# Patient Record
Sex: MALE | Race: WHITE | HISPANIC OR LATINO | Employment: FULL TIME | URBAN - METROPOLITAN AREA
[De-identification: names, ages, dates, MRNs, and addresses within clinical notes are randomized per-mention and may not be internally consistent; named-entity substitution may affect disease eponyms.]

---

## 2019-12-03 ENCOUNTER — OCCUPATIONAL HEALTH (OUTPATIENT)
Dept: URGENT CARE | Facility: CLINIC | Age: 44
End: 2019-12-03

## 2019-12-03 DIAGNOSIS — Z02.83 ENCOUNTER FOR EMPLOYMENT-RELATED DRUG TESTING: ICD-10-CM

## 2019-12-03 PROCEDURE — 80305 PR DRUG SCREEN - 1: ICD-10-PCS | Mod: S$GLB,,, | Performed by: EMERGENCY MEDICINE

## 2019-12-03 PROCEDURE — 80305 DRUG TEST PRSMV DIR OPT OBS: CPT | Mod: S$GLB,,, | Performed by: EMERGENCY MEDICINE

## 2020-05-10 ENCOUNTER — HOSPITAL ENCOUNTER (EMERGENCY)
Facility: HOSPITAL | Age: 45
Discharge: HOME | End: 2020-05-10
Attending: EMERGENCY MEDICINE

## 2020-05-10 VITALS
BODY MASS INDEX: 24.55 KG/M2 | OXYGEN SATURATION: 98 % | HEIGHT: 68 IN | WEIGHT: 162 LBS | SYSTOLIC BLOOD PRESSURE: 130 MMHG | HEART RATE: 90 BPM | DIASTOLIC BLOOD PRESSURE: 86 MMHG | TEMPERATURE: 98 F | RESPIRATION RATE: 16 BRPM

## 2020-05-10 DIAGNOSIS — H16.133 WELDERS' FLASH, BILATERAL: Primary | ICD-10-CM

## 2020-05-10 PROCEDURE — 99283 EMERGENCY DEPT VISIT LOW MDM: CPT

## 2020-05-10 PROCEDURE — 25000003 PHARM REV CODE 250

## 2020-05-10 RX ORDER — TETRACAINE HYDROCHLORIDE 5 MG/ML
2 SOLUTION OPHTHALMIC
Status: COMPLETED | OUTPATIENT
Start: 2020-05-10 | End: 2020-05-10

## 2020-05-10 RX ORDER — METOPROLOL SUCCINATE 25 MG/1
25 TABLET, EXTENDED RELEASE ORAL DAILY
COMMUNITY
End: 2020-11-30

## 2020-05-10 RX ADMIN — FLUORESCEIN SODIUM 1 EACH: 1 STRIP OPHTHALMIC at 03:05

## 2020-05-10 RX ADMIN — TETRACAINE HYDROCHLORIDE 2 DROP: 5 SOLUTION OPHTHALMIC at 03:05

## 2020-05-10 NOTE — ED NOTES
At D/C, Nate Torre is AA & O x 3, his skin is warm and dry, follow up care discussed at length with patient to include meds and follow-up with MD; patient given discharge instructions along with prescriptions, as indicated, and care sheets.

## 2020-07-17 ENCOUNTER — LAB VISIT (OUTPATIENT)
Dept: PRIMARY CARE CLINIC | Facility: OTHER | Age: 45
End: 2020-07-17
Attending: INTERNAL MEDICINE
Payer: OTHER GOVERNMENT

## 2020-07-17 DIAGNOSIS — Z03.818 ENCOUNTER FOR OBSERVATION FOR SUSPECTED EXPOSURE TO OTHER BIOLOGICAL AGENTS RULED OUT: ICD-10-CM

## 2020-07-17 PROCEDURE — U0003 INFECTIOUS AGENT DETECTION BY NUCLEIC ACID (DNA OR RNA); SEVERE ACUTE RESPIRATORY SYNDROME CORONAVIRUS 2 (SARS-COV-2) (CORONAVIRUS DISEASE [COVID-19]), AMPLIFIED PROBE TECHNIQUE, MAKING USE OF HIGH THROUGHPUT TECHNOLOGIES AS DESCRIBED BY CMS-2020-01-R: HCPCS

## 2020-07-22 LAB — SARS-COV-2 RNA RESP QL NAA+PROBE: NEGATIVE

## 2020-08-19 ENCOUNTER — OFFICE VISIT (OUTPATIENT)
Dept: URGENT CARE | Facility: CLINIC | Age: 45
End: 2020-08-19
Payer: OTHER GOVERNMENT

## 2020-08-19 VITALS
BODY MASS INDEX: 27.89 KG/M2 | SYSTOLIC BLOOD PRESSURE: 156 MMHG | TEMPERATURE: 98 F | HEIGHT: 68 IN | DIASTOLIC BLOOD PRESSURE: 93 MMHG | WEIGHT: 184 LBS | OXYGEN SATURATION: 98 % | HEART RATE: 77 BPM | RESPIRATION RATE: 16 BRPM

## 2020-08-19 DIAGNOSIS — L20.9 ATOPIC DERMATITIS, UNSPECIFIED TYPE: Primary | ICD-10-CM

## 2020-08-19 PROCEDURE — 99214 OFFICE O/P EST MOD 30 MIN: CPT | Mod: TIER,S$GLB,, | Performed by: NURSE PRACTITIONER

## 2020-08-19 PROCEDURE — 99214 PR OFFICE/OUTPT VISIT, EST, LEVL IV, 30-39 MIN: ICD-10-PCS | Mod: TIER,S$GLB,, | Performed by: NURSE PRACTITIONER

## 2020-08-19 RX ORDER — HYDROCORTISONE 25 MG/G
CREAM TOPICAL 2 TIMES DAILY
Qty: 28 G | Refills: 0 | Status: SHIPPED | OUTPATIENT
Start: 2020-08-19

## 2020-08-19 NOTE — PROGRESS NOTES
"Subjective:       Patient ID: Nate Torre is a 45 y.o. male.    Vitals:  height is 5' 8" (1.727 m) and weight is 83.5 kg (184 lb). His oral temperature is 98.4 °F (36.9 °C). His blood pressure is 156/93 (abnormal) and his pulse is 77. His respiration is 16 and oxygen saturation is 98%.     Chief Complaint: Rash    C/O red rash to anterior Lt forearm X 2 wks. +itching.     Rash  This is a new problem. The current episode started 1 to 4 weeks ago. Pertinent negatives include no congestion, cough, diarrhea, fatigue, fever, shortness of breath, sore throat or vomiting.       Constitution: Negative for chills, fatigue and fever.   HENT: Negative for congestion and sore throat.    Neck: Negative for painful lymph nodes.   Cardiovascular: Negative for chest pain and leg swelling.   Eyes: Negative for double vision and blurred vision.   Respiratory: Negative for cough and shortness of breath.    Gastrointestinal: Negative for nausea, vomiting and diarrhea.   Genitourinary: Negative for dysuria, frequency and urgency.   Musculoskeletal: Negative for joint pain, joint swelling, muscle cramps and muscle ache.   Skin: Positive for rash. Negative for color change and pale.   Allergic/Immunologic: Positive for itching. Negative for seasonal allergies.   Neurological: Negative for dizziness, history of vertigo, light-headedness, passing out and headaches.   Hematologic/Lymphatic: Negative for swollen lymph nodes, easy bruising/bleeding and history of blood clots. Does not bruise/bleed easily.   Psychiatric/Behavioral: Negative for nervous/anxious, sleep disturbance and depression. The patient is not nervous/anxious.        Objective:      Physical Exam   Constitutional: He is oriented to person, place, and time. He appears well-developed. He is cooperative.  Non-toxic appearance. He does not appear ill. No distress.   HENT:   Head: Normocephalic and atraumatic.   Ears:   Right Ear: Hearing, tympanic membrane, external ear and " ear canal normal.   Left Ear: Hearing, tympanic membrane, external ear and ear canal normal.   Nose: Nose normal. No mucosal edema, rhinorrhea or nasal deformity. No epistaxis. Right sinus exhibits no maxillary sinus tenderness and no frontal sinus tenderness. Left sinus exhibits no maxillary sinus tenderness and no frontal sinus tenderness.   Mouth/Throat: Uvula is midline, oropharynx is clear and moist and mucous membranes are normal. No trismus in the jaw. Normal dentition. No uvula swelling. No posterior oropharyngeal erythema.   Eyes: Conjunctivae and lids are normal. Right eye exhibits no discharge. Left eye exhibits no discharge. No scleral icterus.   Neck: Trachea normal, normal range of motion, full passive range of motion without pain and phonation normal. Neck supple.   Cardiovascular: Normal rate, regular rhythm, normal heart sounds and normal pulses.   Pulmonary/Chest: Effort normal and breath sounds normal. No respiratory distress.   Abdominal: Soft. Normal appearance and bowel sounds are normal. He exhibits no distension, no pulsatile midline mass and no mass. There is no abdominal tenderness.   Musculoskeletal: Normal range of motion.         General: No deformity.   Neurological: He is alert and oriented to person, place, and time. He exhibits normal muscle tone. Coordination normal.   Skin: Skin is warm, dry, intact, not diaphoretic, not pale and rash.      Psychiatric: His speech is normal and behavior is normal. Judgment and thought content normal.   Nursing note and vitals reviewed.        Assessment:       1. Atopic dermatitis, unspecified type        Plan:       Advised to use CeraVe lotion multiple times daily.     Atopic dermatitis, unspecified type    Other orders  -     hydrocortisone 2.5 % cream; Apply topically 2 (two) times daily.  Dispense: 28 g; Refill: 0

## 2020-08-19 NOTE — PATIENT INSTRUCTIONS
Use CeraVe lotion multiple times daily on left hand and forearm.     Atopic Dermatitis (Adult)  Atopic dermatitis is a dry, itchy, red rash. Its also called eczema. The rash is chronic, or ongoing. It can come and go over time. The disease is often passed down in families. It causes a problem with the skin barrier that makes the skin more sensitive to the environment and other factors. The increased skin sensitivity causes an itch, which causes scratching. Scratching can worsen the itching or also break the skin. This can put the skin at risk of infection.  The condition is most common in people with asthma, hay fever, hives, or dry or sensitive skin. The rash may be caused by extreme heat or heavy sweating. Skin irritants can cause the rash to flare up. These can include wool or silk clothing, grease, oils, some medicines, and harsh soaps and detergents. Emotional stress can also be a trigger.  Treatment is done to relieve the itching and inflammation of the skin.  Home care  Follow these tips to care for your condition:  · Keep the areas of rash clean by bathing at least every other day. Use lukewarm water to bathe. Dont use hot water, which can dry out the skin.  · Dont use soaps with strong detergents. Use mild soaps made for sensitive skin.  · Apply a cream or ointment to damp skin right after bathing.  · Avoid things that irritate your skin. Wear absorbent, soft fabrics next to the skin rather than rough or scratchy materials.  · Use mild laundry soap free of scents and perfumes. Make sure to rinse all the soap out of your clothes.  · Treat any skin infection as directed.  · Use oral diphenhydramine to help reduce itching. This is an antihistamine you can buy at drug and grocery stores. It can make you sleepy, so use lower doses during the daytime. Or you can use loratadine. This is an antihistamine that will not make you sleepy. Do not use diphenhydramine if you have glaucoma or have trouble urinating  due to an enlarged prostate.  Follow-up care  See your healthcare provider, or as advised. If your symptoms dont get better or if they get worse in the next 7 days, make an appointment with your healthcare provider.  When to seek medical advice  Call your healthcare provider right away  if any of these occur:  · Increasing area of redness or pain in the skin  · Yellow crusts or wet drainage from the rash  · Fever of 100.4°F (38°C) or higher, or as directed by your healthcare provider  Date Last Reviewed: 9/1/2016 © 2000-2017 [employer]. [address] [place] [id number]. All rights reserved. This information is not intended as a substitute for professional medical care. Always follow your healthcare professional's instructions.        Managing Atopic Dermatitis (Eczema)     After bathing, gently pat your skin dry (dont rub). Apply moisturizer while your skin is still damp.   To manage your symptoms and help reduce the severity and frequency, try these self-care tips:  Caring for your skin  · Use a gentle, fragrance-free cleanser (or nonsoap cleanser) for bathing. Rinse well. Pat skin dry.  · Take warm, not hot, baths or showers. Try to limit them to no more that 10 to 15 minutes.   · Use moisturizer liberally right after you bathe, while your skin is still damp.  · Avoid scratching because it will cause more damage to your skin.   · Topical, over-the-counter hydrocortisone cream may help control mild symptoms.   Controlling your environment  · Avoid extreme heat or cold.  · Avoid very humid or very dry air.  · If your home or office air is very dry, use a humidifier.  · Avoid allergens, such as dust, that may be present in bedding, carpets, plush toys, or rugs.  · Know that pet hair and dander can cause flare-ups.  Seeking medical treatment  Another way to keep symptoms under control is to seek medical treatment. Talk with your healthcare provider about the type of treatment that may work  best for you. Your provider may prescribe treatments such as the following:  · Topical treatments to put on the skin daily  · Medicines taken by mouth (oral medicines), such as antihistamines, antibiotics, or corticosteroids  · In severe cases shots (injections) may be needed to control the symptoms. You may even need antibiotics if skin infections occur.  Treatments dont work the same way for every person. So if your symptoms continue or get worse, ask your healthcare provider about other treatments.  Making lifestyle choices  · Manage the stress in your life.  · Wear loose-fitting cotton clothing that does not bind or rub your skin.  · Avoid contact with wool or other scratchy fabrics.  · Use fragrance-free products.  Getting good results  Now that you know more about atopic dermatitis, the next step is up to you. Follow your healthcare providers treatment plan and your self-care routine. This will help bring atopic dermatitis under control. If your symptoms persist, be sure to let your health care provider know.   Date Last Reviewed: 2/1/2017  © 3063-2521 [employer]. [address] [place] [address], [place] [address]. All rights reserved. This information is not intended as a substitute for professional medical care. Always follow your healthcare professional's instructions.

## 2020-12-08 RX ORDER — OMEPRAZOLE 20 MG/1
20 CAPSULE, DELAYED RELEASE ORAL
COMMUNITY
End: 2020-12-08 | Stop reason: SDUPTHER

## 2020-12-09 RX ORDER — OMEPRAZOLE 20 MG/1
20 CAPSULE, DELAYED RELEASE ORAL DAILY
Qty: 90 CAPSULE | Refills: 3 | Status: SHIPPED | OUTPATIENT
Start: 2020-12-09 | End: 2020-12-14 | Stop reason: SDUPTHER

## 2020-12-14 RX ORDER — OMEPRAZOLE 20 MG/1
20 CAPSULE, DELAYED RELEASE ORAL DAILY
Qty: 90 CAPSULE | Refills: 3 | Status: SHIPPED | OUTPATIENT
Start: 2020-12-14 | End: 2021-02-19 | Stop reason: SDUPTHER

## 2021-02-18 NOTE — TELEPHONE ENCOUNTER
----- Message from Diana Armijo sent at 2/18/2021 12:30 PM CST -----  Regarding: taurus Torre calling regarding Appointment Access  (message) for # appt give patient a call back at [phone] patient said if no answer leave VM anyday is ok for taurus

## 2021-02-22 RX ORDER — OMEPRAZOLE 20 MG/1
20 CAPSULE, DELAYED RELEASE ORAL DAILY
Qty: 90 CAPSULE | Refills: 3 | Status: SHIPPED | OUTPATIENT
Start: 2021-02-22 | End: 2022-02-23 | Stop reason: SDUPTHER

## 2021-06-09 RX ORDER — ATORVASTATIN CALCIUM 40 MG/1
40 TABLET, FILM COATED ORAL DAILY
Qty: 90 TABLET | Refills: 3 | Status: SHIPPED | OUTPATIENT
Start: 2021-06-09 | End: 2021-10-26 | Stop reason: SDUPTHER

## 2021-09-26 NOTE — ED PROVIDER NOTES
Occupational Therapy  First treatment session by MAGAN Brown.  3EF  Visit Type: treatment  Precautions:  Medical precautions:  abdominal precautions;.   Lines:     Basic: IV and wound vac      Lines in chart and on patient reviewed, precautions maintained throughout session.  Hearing: no hearing deficits  Vision:     Current vision: no visual deficits  Safety Measures: chair alarm (call light within reach)    SUBJECTIVE  Patient agreed to participate in therapy this date.  GONSALO Wright aware of tx session and update given to BIJAL Manuel end of tx session. \"they are thinking I may go home tomorrow.\"  Patient / Family Goal: return home and return to previous functional status    OBJECTIVE   Level of consciousness: alert    Oriented to person, place, time and situation     Arousal alertness: appropriate responses to stimuli    Affect/Behavior: alert, appropriate, cooperative and pleasant  Patient activity tolerance: 2 to 1 activity to rest  Functional Communication/Cognition    Overall status:  Within functional limits    Form of communication:  Verbal   Attention span:  Appears intact    Commands: follows all commands and directions consistently.    Safety judgement: good awareness of safety precautions.    Awareness of deficits: fully aware of deficits.  Hand Dominance: right    Transfers:    Assistive devices: gait belt    Sit to stand: supervision    Stand to sit: supervision    Training completed:    Tasks: stand to sit and sit to stand    Education details: patient safety  Functional Ambulation:    Assistance:stand by assist   Assistive device:none and gait belt (pushes IV pole)    Distance (ft): 500    Surface: even      Education details: patient safety  Details/Comments: Patient completing functional mobility with ambulation within room, to bathroom, and hallway 500 feet with supervision assist and no use of assistive device (pushing IV pole). No loss of balance or unsteadiness, steady pace.   Activities  Encounter Date: 5/10/2020     History     Chief Complaint   Patient presents with    pain and redness both eyes     Pt is a pipe-fitter.   Was working beside a  all day.  At 9 pm eyes became red and have become increasingly painful during the night     HPI   43yo M with pain and redness to both eyes. Worst on the left eye. Started at 9pm last night. No vision changes associated. Tearing but no pus/purulent discharge. Feels painful when he blinks. Was next to a  at work yesterday and did not have eye protection on. No itching.    Review of patient's allergies indicates:  No Known Allergies  Past Medical History:   Diagnosis Date    Coronary artery disease     Hypertension      Past Surgical History:   Procedure Laterality Date    CORONARY ARTERY BYPASS GRAFT       History reviewed. No pertinent family history.  Social History     Tobacco Use    Smoking status: Former Smoker   Substance Use Topics    Alcohol use: Not on file    Drug use: Not on file     Review of Systems   Constitutional: Negative for fever.   HENT: Negative for sore throat.    Eyes: Positive for pain and redness.   Respiratory: Negative for shortness of breath.    Cardiovascular: Negative for chest pain.   Gastrointestinal: Negative for nausea.   Genitourinary: Negative for dysuria.   Musculoskeletal: Negative for back pain.   Skin: Negative for rash.   Neurological: Negative for weakness.   Hematological: Does not bruise/bleed easily.       Physical Exam     Initial Vitals   BP Pulse Resp Temp SpO2   05/10/20 0313 05/10/20 0307 05/10/20 0307 05/10/20 0307 05/10/20 0307   130/86 90 16 98.1 °F (36.7 °C) 98 %      MAP       --                Physical Exam    Constitutional: He appears well-developed and well-nourished. He is not diaphoretic. No distress.   HENT:   Head: Normocephalic and atraumatic.   Eyes: EOM are normal. Pupils are equal, round, and reactive to light. Right eye exhibits no discharge. Left eye exhibits no discharge.    Conjunctive injected b/l. Pupils 4mm and reactive b/l   Neck: Normal range of motion. Neck supple.   Cardiovascular: Normal rate, regular rhythm and normal heart sounds. Exam reveals no gallop and no friction rub.    No murmur heard.  Pulmonary/Chest: Breath sounds normal. No respiratory distress. He has no wheezes. He has no rhonchi. He has no rales.   Abdominal: Soft. He exhibits no distension. There is no tenderness. There is no rebound and no guarding.   Musculoskeletal: He exhibits no edema or tenderness.   Neurological: He is alert and oriented to person, place, and time.   Skin: Skin is warm and dry.   Psychiatric: He has a normal mood and affect. Thought content normal.         ED Course   Procedures  Labs Reviewed - No data to display     MDM  43yo M with b/l eye pain after close proximity to a .  Handy lamp with stipling of the cornea. Tonopen demonstrates 23 on the right, 21 on the left. 20/20 vision without assistance. Likely 's eye based on the presentation and findings. No evidence of bacterial infection, less suspicious of allergic conjunctivitis given the story. Will discharge with erythromycin ointment. Will also give tetracaine drops and have given strict instructions that these cannot be used more than 24 hours and not more than every 4 hours in that time or there will be damage to the cornea. He will f/u with ophthalmology is he continues to have problems.    Mamadou Meyer MD  Resident, PGY-3  5/10/2020 3:48 AM      Imaging Results    None                                          Clinical Impression:       ICD-10-CM ICD-9-CM   1. Welders' flash, bilateral H16.133 370.24                                Mamadou Meyer MD  Resident  05/10/20 5085     of Daily Living (ADLs):  Grooming/Oral Hygiene:     Grooming assist: set up    Oral hygiene assist: set up    Position: standing at sink    Assist needed for: wash/dry face, wash/dry hands and teeth care  Toilet transfer:     Assist: supervision    Device: gait belt    Equipment: grab bar use  Toileting:     Assist: modified independent    Equipment: urinal  Activities of daily living training:   Patient with good standing tolerance with completion of grooming tasks standing sink side.              ASSESSMENT    Impairments: activity tolerance and pain  Functional Limitations: functional mobility, grooming, bathing, toileting, functional transfers, participating in meaningful/purposeful activities and dressing  Patient making good progress towards goals with increased overall activity tolerance and strong desire to regain prior functioning level  I agreed with the plan of care as outlined by the therapist.    Skilled therapy is required to address these limitations in attempt to maximize the patient's independence.  Progress: improving as expected and progressing toward goals    End of Session:   Location: in bathroom  Safety measures: call light within reach  Handoff to: nurse assistant    PLAN  Suggestions for next session as indicated: LB dressing skills, DC concerns    Frequency Comments: X M-F (9/26)            Interventions: activity tolerance training, ADL retraining, compensatory technique education, compensatory techniques, equipment eval/education, functional transfer training, patient education and patient/family training  Agreement to plan and goals: patient agrees with goals and treatment plan      GOALS  Review Date: 9/27/2021  Long Term Goals: (to be met by time of discharge from hospital)  Grooming: Patient will complete grooming tasks in standing modified independent.  Lower body dressing: Patient will complete lower body dressing modified independent.  Toileting: Patient will complete toileting  modified independent.  Toilet transfer: Patient will complete toilet transfer with modified independent.   Home setting transfer: Patient will complete home setting transfers with modified independent.         Documented in the chart in the following areas: Pain. Assessment. Plan.      SOLIMAN note Cosign/Accountability:    First Time POC:   Patient progress, plan of care and goals reviewed between providing occupational therapist and certified occupational therapy assistant.  Certified occupational therapy assistant to continue with current plan of care, current goals are appropriate and patient is progressing towards these set goals.    Talia Acosta, OT  Therapy procedure time and total treatment time can be found documented on the Time Entry flowsheet

## 2021-10-04 ENCOUNTER — TELEPHONE (OUTPATIENT)
Dept: CARDIOLOGY | Facility: CLINIC | Age: 46
End: 2021-10-04

## 2021-10-04 ENCOUNTER — PATIENT MESSAGE (OUTPATIENT)
Dept: CARDIOLOGY | Facility: CLINIC | Age: 46
End: 2021-10-04

## 2021-10-04 DIAGNOSIS — I10 HYPERTENSION, UNSPECIFIED TYPE: Primary | ICD-10-CM

## 2021-10-11 ENCOUNTER — HOSPITAL ENCOUNTER (OUTPATIENT)
Dept: CARDIOLOGY | Facility: CLINIC | Age: 46
Discharge: HOME | End: 2021-10-11
Attending: INTERNAL MEDICINE
Payer: COMMERCIAL

## 2021-10-11 DIAGNOSIS — I10 HYPERTENSION, UNSPECIFIED TYPE: ICD-10-CM

## 2021-10-11 PROCEDURE — 93015 CV STRESS TEST SUPVJ I&R: CPT | Mod: S$GLB,,, | Performed by: INTERNAL MEDICINE

## 2021-10-11 PROCEDURE — 93015 EXERCISE STRESS - EKG (CUPID ONLY): ICD-10-PCS | Mod: S$GLB,,, | Performed by: INTERNAL MEDICINE

## 2021-10-18 LAB
CV STRESS BASE HR: 73 BPM
DIASTOLIC BLOOD PRESSURE: 98 MMHG
OHS CV CPX 1 MINUTE RECOVERY HEART RATE: 114 BPM
OHS CV CPX 85 PERCENT MAX PREDICTED HEART RATE MALE: 147
OHS CV CPX ESTIMATED METS: 11
OHS CV CPX MAX PREDICTED HEART RATE: 173
OHS CV CPX PATIENT IS FEMALE: 0
OHS CV CPX PATIENT IS MALE: 1
OHS CV CPX PEAK DIASTOLIC BLOOD PRESSURE: 96 MMHG
OHS CV CPX PEAK HEAR RATE: 150 BPM
OHS CV CPX PEAK RATE PRESSURE PRODUCT: NORMAL
OHS CV CPX PEAK SYSTOLIC BLOOD PRESSURE: 184 MMHG
OHS CV CPX PERCENT MAX PREDICTED HEART RATE ACHIEVED: 87
OHS CV CPX RATE PRESSURE PRODUCT PRESENTING: NORMAL
STRESS ECHO POST EXERCISE DUR MIN: 9 MINUTES
STRESS ECHO POST EXERCISE DUR SEC: 22 SECONDS
SYSTOLIC BLOOD PRESSURE: 158 MMHG

## 2021-10-26 ENCOUNTER — OFFICE VISIT (OUTPATIENT)
Dept: CARDIOLOGY | Facility: CLINIC | Age: 46
End: 2021-10-26
Payer: COMMERCIAL

## 2021-10-26 VITALS
DIASTOLIC BLOOD PRESSURE: 88 MMHG | HEIGHT: 68 IN | SYSTOLIC BLOOD PRESSURE: 140 MMHG | WEIGHT: 190 LBS | OXYGEN SATURATION: 98 % | HEART RATE: 87 BPM | BODY MASS INDEX: 28.79 KG/M2

## 2021-10-26 DIAGNOSIS — I10 HYPERTENSION, UNSPECIFIED TYPE: Primary | ICD-10-CM

## 2021-10-26 DIAGNOSIS — I25.810 CORONARY ARTERY DISEASE INVOLVING CORONARY BYPASS GRAFT OF NATIVE HEART WITHOUT ANGINA PECTORIS: ICD-10-CM

## 2021-10-26 DIAGNOSIS — E78.5 DYSLIPIDEMIA: ICD-10-CM

## 2021-10-26 PROCEDURE — 99214 PR OFFICE/OUTPT VISIT, EST, LEVL IV, 30-39 MIN: ICD-10-PCS | Mod: S$GLB,,, | Performed by: NURSE PRACTITIONER

## 2021-10-26 PROCEDURE — 99214 OFFICE O/P EST MOD 30 MIN: CPT | Mod: S$GLB,,, | Performed by: NURSE PRACTITIONER

## 2021-10-26 RX ORDER — AMLODIPINE AND BENAZEPRIL HYDROCHLORIDE 5; 20 MG/1; MG/1
1 CAPSULE ORAL DAILY
Qty: 90 CAPSULE | Refills: 3 | Status: SHIPPED | OUTPATIENT
Start: 2021-10-26 | End: 2023-03-30 | Stop reason: SDUPTHER

## 2021-10-26 RX ORDER — ATORVASTATIN CALCIUM 40 MG/1
40 TABLET, FILM COATED ORAL DAILY
Qty: 90 TABLET | Refills: 3 | Status: SHIPPED | OUTPATIENT
Start: 2021-10-26 | End: 2022-05-26

## 2021-10-26 RX ORDER — METOPROLOL SUCCINATE 25 MG/1
25 TABLET, EXTENDED RELEASE ORAL DAILY
Qty: 90 TABLET | Refills: 3 | Status: SHIPPED | OUTPATIENT
Start: 2021-10-26 | End: 2021-12-12

## 2021-11-29 RX ORDER — METOPROLOL SUCCINATE 25 MG/1
25 TABLET, EXTENDED RELEASE ORAL DAILY
Qty: 90 TABLET | Refills: 3 | Status: CANCELLED | OUTPATIENT
Start: 2021-11-29

## 2022-03-19 ENCOUNTER — OFFICE VISIT (OUTPATIENT)
Dept: URGENT CARE | Facility: CLINIC | Age: 47
End: 2022-03-19
Payer: COMMERCIAL

## 2022-03-19 VITALS
TEMPERATURE: 97 F | RESPIRATION RATE: 20 BRPM | BODY MASS INDEX: 29.83 KG/M2 | WEIGHT: 196.81 LBS | HEART RATE: 80 BPM | HEIGHT: 68 IN | DIASTOLIC BLOOD PRESSURE: 100 MMHG | SYSTOLIC BLOOD PRESSURE: 156 MMHG | OXYGEN SATURATION: 98 %

## 2022-03-19 DIAGNOSIS — M25.561 ACUTE PAIN OF RIGHT KNEE: ICD-10-CM

## 2022-03-19 PROCEDURE — 99203 PR OFFICE/OUTPT VISIT, NEW, LEVL III, 30-44 MIN: ICD-10-PCS | Mod: TIER,S$GLB,, | Performed by: NURSE PRACTITIONER

## 2022-03-19 PROCEDURE — 73562 XR KNEE 3 VIEW RIGHT: ICD-10-PCS | Mod: RT,S$GLB,, | Performed by: RADIOLOGY

## 2022-03-19 PROCEDURE — 99203 OFFICE O/P NEW LOW 30 MIN: CPT | Mod: TIER,S$GLB,, | Performed by: NURSE PRACTITIONER

## 2022-03-19 PROCEDURE — 73562 X-RAY EXAM OF KNEE 3: CPT | Mod: RT,S$GLB,, | Performed by: RADIOLOGY

## 2022-03-19 RX ORDER — NAPROXEN 500 MG/1
500 TABLET ORAL 2 TIMES DAILY WITH MEALS
Qty: 14 TABLET | Refills: 0 | Status: SHIPPED | OUTPATIENT
Start: 2022-03-19 | End: 2022-03-26

## 2022-03-19 RX ORDER — ASPIRIN 81 MG/1
81 TABLET ORAL
COMMUNITY
End: 2023-03-30 | Stop reason: SDUPTHER

## 2022-03-19 NOTE — PROGRESS NOTES
"Subjective:       Patient ID: Nate Torre is a 46 y.o. male.    Vitals:  height is 5' 8" (1.727 m) and weight is 89.3 kg (196 lb 12.8 oz). His temperature is 97.3 °F (36.3 °C). His blood pressure is 156/100 (abnormal) and his pulse is 80. His respiration is 20 and oxygen saturation is 98%.     Chief Complaint: Knee Pain    Pt was sick three weeks ago with flu-like symptoms and started feeling right knee pain around the same time. Pain occurs with bending and squatting. Pt states no hx of arthritis but hx of heart procedure in 2016 to remove a blockage. He is taking Tylenol for pain. He says he has had no injury. No pain at rest. No swelling, no open wounds, no redness. No tender to palpation only with flexion and movement of the knee. No other abnormal symptoms.    Past Medical History:  No date: Coronary artery disease  No date: Hypertension    Past Surgical History:  No date: CORONARY ARTERY BYPASS GRAFT    History reviewed.  No pertinent family history.      Social History    Socioeconomic History      Marital status: Single    Tobacco Use      Smoking status: Former Smoker      Smokeless tobacco: Former User    Substance and Sexual Activity      Alcohol use: Yes        Alcohol/week: 1.0 standard drink        Types: 1 Cans of beer per week        Comment: 7 cans a week      Drug use: Never      Current Outpatient Medications:  amlodipine-benazepril 5-20 mg (LOTREL) 5-20 mg per capsule, Take 1 capsule by mouth once daily., Disp: 90 capsule, Rfl: 3  aspirin (ECOTRIN) 81 MG EC tablet, Take 81 mg by mouth., Disp: , Rfl:   atorvastatin (LIPITOR) 40 MG tablet, Take 1 tablet (40 mg total) by mouth once daily., Disp: 90 tablet, Rfl: 3  cloNIDine (CATAPRES) 0.1 MG tablet, TAKE 1 TABLET BY MOUTH ONCE DAILY AS NEEDED FOR  SYSTOLIC  BLOOD  PRESSURE  OVER  170, Disp: 30 tablet, Rfl: 0  hydrocortisone 2.5 % cream, Apply topically 2 (two) times daily. (Patient not taking: Reported on 10/26/2021), Disp: 28 g, Rfl: " 0  metoprolol succinate (TOPROL-XL) 25 MG 24 hr tablet, Take 1 tablet by mouth once daily, Disp: 90 tablet, Rfl: 0  omeprazole (PRILOSEC) 20 MG capsule, Take 1 capsule by mouth once daily, Disp: 90 capsule, Rfl: 0    No current facility-administered medications for this visit.      Review of patient's allergies indicates:  No Known Allergies    Knee Pain   The incident occurred more than 1 week ago. There was no injury mechanism. The pain is present in the right knee. The quality of the pain is described as shooting and cramping. He reports no foreign bodies present. The symptoms are aggravated by movement. He has tried acetaminophen for the symptoms. The treatment provided mild relief.       Constitution: Negative. Negative for chills, sweating, fatigue and fever.   HENT: Negative.    Neck: neck negative.   Cardiovascular: Negative.  Negative for chest pain and leg swelling.   Respiratory: Negative.  Negative for cough and shortness of breath.    Gastrointestinal: Negative.  Negative for nausea, constipation and diarrhea.   Genitourinary: Negative.    Musculoskeletal: Positive for pain, joint pain and pain with walking. Negative for trauma, joint swelling, abnormal ROM of joint, back pain and muscle ache.   Skin: Negative for rash, erythema and bruising.   Neurological: Negative.  Negative for dizziness.       Objective:      Physical Exam   Constitutional: He is oriented to person, place, and time.  Non-toxic appearance. No distress.   HENT:   Head: Normocephalic and atraumatic.   Cardiovascular: Normal rate.   Pulmonary/Chest: Effort normal. No respiratory distress.   Abdominal: Normal appearance.   Musculoskeletal:         General: No swelling.      Right knee: He exhibits decreased range of motion. He exhibits no swelling, no erythema, no LCL laxity, no bony tenderness and no MCL laxity. No tenderness found.      Right lower leg: No edema.      Left lower leg: No edema.        Legs:    Neurological: no focal  deficit. He is alert and oriented to person, place, and time.   Skin: Skin is no rash. No bruising and No erythema   Psychiatric: His behavior is normal. Mood normal.         Assessment:       1. Acute pain of right knee          Plan:     Xray show small joint effusion. Naprosyn, ice, avoid strenuous activity. See orthopedics if not resolving. Advised can not rule out blood clot in this setting, ED for any leg swelling, worsneing pain, or new or changing symptoms.     Acute pain of right knee  -     XR KNEE 3 VIEW RIGHT; Future; Expected date: 03/19/2022  -     Ambulatory referral/consult to Orthopedics    Other orders  -     naproxen (NAPROSYN) 500 MG tablet; Take 1 tablet (500 mg total) by mouth 2 (two) times daily with meals. for 7 days  Dispense: 14 tablet; Refill: 0

## 2022-03-21 ENCOUNTER — CLINICAL SUPPORT (OUTPATIENT)
Dept: URGENT CARE | Facility: CLINIC | Age: 47
End: 2022-03-21
Payer: COMMERCIAL

## 2022-03-21 VITALS
HEIGHT: 68 IN | HEART RATE: 82 BPM | OXYGEN SATURATION: 97 % | RESPIRATION RATE: 16 BRPM | TEMPERATURE: 97 F | BODY MASS INDEX: 29.7 KG/M2 | DIASTOLIC BLOOD PRESSURE: 80 MMHG | SYSTOLIC BLOOD PRESSURE: 139 MMHG | WEIGHT: 196 LBS

## 2022-03-21 DIAGNOSIS — M25.461 EFFUSION OF RIGHT KNEE: Primary | ICD-10-CM

## 2022-03-21 PROCEDURE — 99213 OFFICE O/P EST LOW 20 MIN: CPT | Mod: S$GLB,,, | Performed by: NURSE PRACTITIONER

## 2022-03-21 PROCEDURE — 99213 PR OFFICE/OUTPT VISIT, EST, LEVL III, 20-29 MIN: ICD-10-PCS | Mod: S$GLB,,, | Performed by: NURSE PRACTITIONER

## 2022-03-21 NOTE — PROGRESS NOTES
"Subjective:       Patient ID: Nate Torre is a 46 y.o. male.    Vitals:  height is 5' 8" (1.727 m) and weight is 88.9 kg (196 lb). His temperature is 97.4 °F (36.3 °C). His blood pressure is 139/80 and his pulse is 82. His respiration is 16 and oxygen saturation is 97%.     Chief Complaint: Follow-up    Pt. Was recently seen here Saturday for right knee pain, where x ray was done, and fluid was found behind knee, and was prescribed meds. Pt. Is now stating that he is worried about the meds prescribed to him, he had open heart sx in 2016, and was reading the paper the pharmacy gave him ,when he went to get his prescription, and is now worried about contraindication. Pt. Still complains of right knee pain, says pain is unchanged.   He is only taking 1 dose of Naprosyn.  He is also return to work and declined work excuse or light duty for work.  Patient's job requires him to climb ladders and crawled down in to small spaces.    Follow-up  Associated symptoms include arthralgias (Right knee) and joint swelling (Mild).       Musculoskeletal: Positive for joint pain (Right knee) and joint swelling (Mild). Negative for trauma.   Skin: Negative for erythema.       Objective:      Physical Exam   Constitutional: He is oriented to person, place, and time. He appears well-developed.  Non-toxic appearance. He does not appear ill. No distress.   HENT:   Head: Normocephalic and atraumatic.   Nose: Nose normal.   Mouth/Throat: Oropharynx is clear and moist.   Eyes: Conjunctivae and EOM are normal.   Abdominal: Normal appearance.   Musculoskeletal: Normal range of motion.         General: Tenderness (Right knee with activity and range of motion) present. No swelling, deformity or signs of injury. Normal range of motion.   Neurological: no focal deficit. He is alert and oriented to person, place, and time.   Skin: Skin is warm, dry and not diaphoretic. Capillary refill takes 2 to 3 seconds. No bruising, No erythema and No " lesion   Psychiatric: His behavior is normal. Mood normal.   Nursing note and vitals reviewed.        Assessment:       1. Effusion of right knee          Plan:         Effusion of right knee  -     Ambulatory referral/consult to Orthopedics    RICE, continue naprosyn.  Knee brace.  Offered work restrictions note vs out of work to rest knee.  Declined       Home care instructions provided.  Offered work excuse and or restricted duty at work, patient declined.  Informed patient that Naprosyn dose with short term course should be of very low risk to cardiovascular concerns without recent history of MI or other cardiac symptoms.  Informed to monitor blood pressure as well and if remaining elevated greater than 160/95 to stop taking Naprosyn.  Patient request referral to Orthopedics, referral placed.  I believe the patient is hoping that Orthopedics will be able to drain fluid from his knee.  I did discuss with him that by x-ray this is a small knee effusion and I am not certain if this is something that orthopedics would be willing to take on with invasive procedure when rest, ice, compression, NSAID use as prescribed would most likely resolve problem in a reasonable time.

## 2022-03-22 ENCOUNTER — TELEPHONE (OUTPATIENT)
Dept: CARDIOLOGY | Facility: CLINIC | Age: 47
End: 2022-03-22
Payer: COMMERCIAL

## 2022-03-22 ENCOUNTER — TELEPHONE (OUTPATIENT)
Dept: FAMILY MEDICINE | Facility: CLINIC | Age: 47
End: 2022-03-22
Payer: COMMERCIAL

## 2022-03-22 NOTE — PATIENT INSTRUCTIONS
Limit activity right knee.  Try to minimize climbing and or crawling activities.   Activity as tolerated.  Rest frequently and elevate legs as often as possible.  Purchase and knee brace and wear while at work for immobilization and protection of joint from excessive stress and strain on the knee.  Ice to knee for 15-20 minutes every few hours if possible.  Continue to take Naprosyn as prescribed.  It does a low dose at for a short period of time and is at low risk for cardiovascular concerns due to the short course of the prescription.  Referral placed to orthopedics for further evaluation and treatment of same

## 2022-03-22 NOTE — TELEPHONE ENCOUNTER
Spoke with pt, requesting hospital follow up. Unable to make appointment in Epic at the moment. Will print an call when able.

## 2022-03-22 NOTE — TELEPHONE ENCOUNTER
----- Message from Timoteo Gonzalez MA sent at 3/22/2022 12:57 PM CDT -----  Contact: LUDWIG BARR [[id number]]  Type: Needs Medical Advice    Who Called:LUDWIG BARR [[id number]]  Best Call Back Number: [phone]  Inquiry/Question: Would you kindly call LUDWIG BARR [[id number]] regarding upcoming appt would like to know if they need labs if no answer please leave message  Thank you~

## 2022-04-04 ENCOUNTER — TELEPHONE (OUTPATIENT)
Dept: CARDIOLOGY | Facility: CLINIC | Age: 47
End: 2022-04-04
Payer: COMMERCIAL

## 2022-04-04 DIAGNOSIS — E78.5 HYPERLIPIDEMIA, UNSPECIFIED HYPERLIPIDEMIA TYPE: Primary | ICD-10-CM

## 2022-04-05 ENCOUNTER — OFFICE VISIT (OUTPATIENT)
Dept: ORTHOPEDICS | Facility: CLINIC | Age: 47
End: 2022-04-05
Payer: COMMERCIAL

## 2022-04-05 VITALS — HEIGHT: 68 IN | WEIGHT: 196 LBS | BODY MASS INDEX: 29.7 KG/M2

## 2022-04-05 DIAGNOSIS — S83.241A ACUTE TEAR MEDIAL MENISCUS, RIGHT, INITIAL ENCOUNTER: Primary | ICD-10-CM

## 2022-04-05 DIAGNOSIS — M25.561 RIGHT KNEE PAIN, UNSPECIFIED CHRONICITY: Primary | ICD-10-CM

## 2022-04-05 PROCEDURE — 99999 PR PBB SHADOW E&M-EST. PATIENT-LVL II: CPT | Mod: PBBFAC,,, | Performed by: ORTHOPAEDIC SURGERY

## 2022-04-05 PROCEDURE — 99212 OFFICE O/P EST SF 10 MIN: CPT | Mod: PBBFAC,PN | Performed by: ORTHOPAEDIC SURGERY

## 2022-04-05 PROCEDURE — 99999 PR PBB SHADOW E&M-EST. PATIENT-LVL II: ICD-10-PCS | Mod: PBBFAC,,, | Performed by: ORTHOPAEDIC SURGERY

## 2022-04-05 PROCEDURE — 99204 OFFICE O/P NEW MOD 45 MIN: CPT | Mod: S$GLB,,, | Performed by: ORTHOPAEDIC SURGERY

## 2022-04-05 PROCEDURE — 99204 PR OFFICE/OUTPT VISIT, NEW, LEVL IV, 45-59 MIN: ICD-10-PCS | Mod: S$GLB,,, | Performed by: ORTHOPAEDIC SURGERY

## 2022-04-05 NOTE — PROGRESS NOTES
CC:  46-year-old male presents for evaluation of right knee pain.  The patient reports popping and buckling of the right knee.  He does not recall any particular injury but has been having the symptoms for several months now.  He has been taken Motrin and trying activity modification without much relief.  He was seen at an urgent care recently where they obtained x-rays that were read out as normal.  They advised him to follow-up with an orthopedist.  He presents today for evaluation.  He rates his pain as a 5/10.    Past Medical History:   Diagnosis Date    Coronary artery disease     Hypertension        Past Surgical History:   Procedure Laterality Date    CORONARY ARTERY BYPASS GRAFT         Current Outpatient Medications on File Prior to Visit   Medication Sig Dispense Refill    amlodipine-benazepril 5-20 mg (LOTREL) 5-20 mg per capsule Take 1 capsule by mouth once daily. 90 capsule 3    aspirin (ECOTRIN) 81 MG EC tablet Take 81 mg by mouth.      atorvastatin (LIPITOR) 40 MG tablet Take 1 tablet (40 mg total) by mouth once daily. 90 tablet 3    cloNIDine (CATAPRES) 0.1 MG tablet TAKE 1 TABLET BY MOUTH ONCE DAILY AS NEEDED FOR  SYSTOLIC  BLOOD  PRESSURE  OVER  170 30 tablet 0    hydrocortisone 2.5 % cream Apply topically 2 (two) times daily. (Patient not taking: Reported on 10/26/2021) 28 g 0    metoprolol succinate (TOPROL-XL) 25 MG 24 hr tablet Take 1 tablet by mouth once daily 90 tablet 0    omeprazole (PRILOSEC) 20 MG capsule Take 1 capsule by mouth once daily 90 capsule 0     No current facility-administered medications on file prior to visit.       ROS:    Constitution: Denies chills, fever, and sweats.  HENT: Denies headaches or blurry vision.  Cardiovascular: Denies chest pain or irregular heart beat.  Respiratory: Denies cough or shortness of breath.  Gastrointestinal: Denies abdominal pain, nausea, or vomiting.  Genitourinary:  Denies urinary incontinence, bladder and kidney  issues  Musculoskeletal:  Denies muscle cramps.  Neurological: Denies dizziness or focal weakness.  Psychiatric/Behavioral: Normal mental status.  Hematologic/Lymphatic: Denies bleeding problem or easy bruising/bleeding.  Skin: Denies rash or suspicious lesions.    Physical examination     Gen - No acute distress, well nourished, well groomed   Eyes - Extraoccular motions intact, pupils equally round and reactive to light and accommodation   ENT - normocephalic, atruamtic, oropharynx clear   Neck - Supple, no abnormal masses   Cardiovascular - regular rate and rhythm   Pulmonary - clear to auscultation bilaterally, no wheezes, ronchi, or rales   Abdomen - soft, non-tender, non-distended, positive bowel sounds   Psych - The patient is alert and oriented x3 with normal mood and affect    Examination of the Right Lower Extremity:     Skin intact throughout.  Motor function is intact distally EHL/FHL/TA/cory   +2 dorsalis pedis and posterior tibial pulses   Sensation to light touch intact distally dorsal, plantar, and first web space     Examination of the Right knee:    ROM 0 - 150   Effusion positive  Tenderness to palpation at the joint line positive  Pain during range of motion negative  Crepitation during range of motion negative     negative increased pain noted with flexion past 90   negative antalgic gait noted   negative Lachman's Test   negative Anterior Drawer Test   negative Posterior Drawer Test   positive McMurrays Test   positive Disco Test   negative Varus/Valgus instability    X-ray images were examined and personally interpreted by me.  Three views the right knee dated 03/19/2022 show well-maintained joint space with no advanced arthritic changes and no acute fractures.    Dx:  Likely medial meniscus tear right knee    Plan:  Recommendation is for an MRI of the right knee.  Follow up when the MRI is complete.

## 2022-04-09 ENCOUNTER — HOSPITAL ENCOUNTER (OUTPATIENT)
Dept: RADIOLOGY | Facility: HOSPITAL | Age: 47
Discharge: HOME | End: 2022-04-09
Attending: ORTHOPAEDIC SURGERY
Payer: COMMERCIAL

## 2022-04-09 DIAGNOSIS — M25.561 RIGHT KNEE PAIN, UNSPECIFIED CHRONICITY: ICD-10-CM

## 2022-04-09 PROCEDURE — 73721 MRI JNT OF LWR EXTRE W/O DYE: CPT | Mod: 26,RT,, | Performed by: RADIOLOGY

## 2022-04-09 PROCEDURE — 73721 MRI JNT OF LWR EXTRE W/O DYE: CPT | Mod: TC,RT

## 2022-04-09 PROCEDURE — 73721 MRI KNEE WITHOUT CONTRAST RIGHT: ICD-10-PCS | Mod: 26,RT,, | Performed by: RADIOLOGY

## 2022-04-14 ENCOUNTER — OFFICE VISIT (OUTPATIENT)
Dept: ORTHOPEDICS | Facility: CLINIC | Age: 47
End: 2022-04-14
Payer: COMMERCIAL

## 2022-04-14 VITALS — RESPIRATION RATE: 17 BRPM | BODY MASS INDEX: 29.55 KG/M2 | WEIGHT: 195 LBS | HEIGHT: 68 IN

## 2022-04-14 DIAGNOSIS — M76.51 PATELLAR TENDINITIS OF RIGHT KNEE: Primary | ICD-10-CM

## 2022-04-14 PROCEDURE — 99213 PR OFFICE/OUTPT VISIT, EST, LEVL III, 20-29 MIN: ICD-10-PCS | Mod: 25,S$GLB,, | Performed by: ORTHOPAEDIC SURGERY

## 2022-04-14 PROCEDURE — 1159F MED LIST DOCD IN RCRD: CPT | Mod: CPTII,S$GLB,, | Performed by: ORTHOPAEDIC SURGERY

## 2022-04-14 PROCEDURE — 20610 LARGE JOINT ASPIRATION/INJECTION: R KNEE: ICD-10-PCS | Mod: RT,S$GLB,, | Performed by: ORTHOPAEDIC SURGERY

## 2022-04-14 PROCEDURE — 3008F PR BODY MASS INDEX (BMI) DOCUMENTED: ICD-10-PCS | Mod: CPTII,S$GLB,, | Performed by: ORTHOPAEDIC SURGERY

## 2022-04-14 PROCEDURE — 4010F PR ACE/ARB THEARPY RXD/TAKEN: ICD-10-PCS | Mod: CPTII,S$GLB,, | Performed by: ORTHOPAEDIC SURGERY

## 2022-04-14 PROCEDURE — 99999 PR PBB SHADOW E&M-EST. PATIENT-LVL III: ICD-10-PCS | Mod: PBBFAC,,, | Performed by: ORTHOPAEDIC SURGERY

## 2022-04-14 PROCEDURE — 99213 OFFICE O/P EST LOW 20 MIN: CPT | Mod: 25,S$GLB,, | Performed by: ORTHOPAEDIC SURGERY

## 2022-04-14 PROCEDURE — 4010F ACE/ARB THERAPY RXD/TAKEN: CPT | Mod: CPTII,S$GLB,, | Performed by: ORTHOPAEDIC SURGERY

## 2022-04-14 PROCEDURE — 3008F BODY MASS INDEX DOCD: CPT | Mod: CPTII,S$GLB,, | Performed by: ORTHOPAEDIC SURGERY

## 2022-04-14 PROCEDURE — 99999 PR PBB SHADOW E&M-EST. PATIENT-LVL III: CPT | Mod: PBBFAC,,, | Performed by: ORTHOPAEDIC SURGERY

## 2022-04-14 PROCEDURE — 20610 DRAIN/INJ JOINT/BURSA W/O US: CPT | Mod: RT,S$GLB,, | Performed by: ORTHOPAEDIC SURGERY

## 2022-04-14 PROCEDURE — 1159F PR MEDICATION LIST DOCUMENTED IN MEDICAL RECORD: ICD-10-PCS | Mod: CPTII,S$GLB,, | Performed by: ORTHOPAEDIC SURGERY

## 2022-04-14 RX ORDER — METHYLPREDNISOLONE ACETATE 40 MG/ML
40 INJECTION, SUSPENSION INTRA-ARTICULAR; INTRALESIONAL; INTRAMUSCULAR; SOFT TISSUE
Status: DISCONTINUED | OUTPATIENT
Start: 2022-04-14 | End: 2022-04-14 | Stop reason: HOSPADM

## 2022-04-14 RX ADMIN — METHYLPREDNISOLONE ACETATE 40 MG: 40 INJECTION, SUSPENSION INTRA-ARTICULAR; INTRALESIONAL; INTRAMUSCULAR; SOFT TISSUE at 03:04

## 2022-04-14 NOTE — PROCEDURES
Large Joint Aspiration/Injection: R knee    Date/Time: 4/14/2022 3:30 PM  Performed by: Dontae Leach II, MD  Authorized by: Dontae Leach II, MD     Consent Done?:  Yes (Verbal)  Indications:  Pain  Timeout: prior to procedure the correct patient, procedure, and site was verified    Prep: patient was prepped and draped in usual sterile fashion      Local anesthesia used?: Yes    Local anesthetic:  Topical anesthetic    Details:  Needle Size:  22 G  Approach:  Anteromedial  Location:  Knee  Site:  R knee  Medications:  40 mg methylPREDNISolone acetate 40 mg/mL  Patient tolerance:  Patient tolerated the procedure well with no immediate complications

## 2022-12-24 ENCOUNTER — OFFICE VISIT (OUTPATIENT)
Dept: URGENT CARE | Facility: CLINIC | Age: 47
End: 2022-12-24
Payer: COMMERCIAL

## 2022-12-24 VITALS
SYSTOLIC BLOOD PRESSURE: 166 MMHG | HEIGHT: 68 IN | TEMPERATURE: 98 F | HEART RATE: 79 BPM | DIASTOLIC BLOOD PRESSURE: 109 MMHG | BODY MASS INDEX: 28.34 KG/M2 | RESPIRATION RATE: 16 BRPM | OXYGEN SATURATION: 98 % | WEIGHT: 187 LBS

## 2022-12-24 DIAGNOSIS — R03.0 ELEVATED BLOOD PRESSURE READING: ICD-10-CM

## 2022-12-24 DIAGNOSIS — Z86.79 HISTORY OF HYPERTENSION: Primary | ICD-10-CM

## 2022-12-24 DIAGNOSIS — Z76.0 ENCOUNTER FOR MEDICATION REFILL: ICD-10-CM

## 2022-12-24 PROCEDURE — 3077F PR MOST RECENT SYSTOLIC BLOOD PRESSURE >= 140 MM HG: ICD-10-PCS | Mod: CPTII,S$GLB,, | Performed by: NURSE PRACTITIONER

## 2022-12-24 PROCEDURE — 3008F PR BODY MASS INDEX (BMI) DOCUMENTED: ICD-10-PCS | Mod: CPTII,S$GLB,, | Performed by: NURSE PRACTITIONER

## 2022-12-24 PROCEDURE — 1160F RVW MEDS BY RX/DR IN RCRD: CPT | Mod: CPTII,S$GLB,, | Performed by: NURSE PRACTITIONER

## 2022-12-24 PROCEDURE — 3008F BODY MASS INDEX DOCD: CPT | Mod: CPTII,S$GLB,, | Performed by: NURSE PRACTITIONER

## 2022-12-24 PROCEDURE — 4010F ACE/ARB THERAPY RXD/TAKEN: CPT | Mod: CPTII,S$GLB,, | Performed by: NURSE PRACTITIONER

## 2022-12-24 PROCEDURE — 1160F PR REVIEW ALL MEDS BY PRESCRIBER/CLIN PHARMACIST DOCUMENTED: ICD-10-PCS | Mod: CPTII,S$GLB,, | Performed by: NURSE PRACTITIONER

## 2022-12-24 PROCEDURE — 3080F PR MOST RECENT DIASTOLIC BLOOD PRESSURE >= 90 MM HG: ICD-10-PCS | Mod: CPTII,S$GLB,, | Performed by: NURSE PRACTITIONER

## 2022-12-24 PROCEDURE — 1159F PR MEDICATION LIST DOCUMENTED IN MEDICAL RECORD: ICD-10-PCS | Mod: CPTII,S$GLB,, | Performed by: NURSE PRACTITIONER

## 2022-12-24 PROCEDURE — 3080F DIAST BP >= 90 MM HG: CPT | Mod: CPTII,S$GLB,, | Performed by: NURSE PRACTITIONER

## 2022-12-24 PROCEDURE — 4010F PR ACE/ARB THEARPY RXD/TAKEN: ICD-10-PCS | Mod: CPTII,S$GLB,, | Performed by: NURSE PRACTITIONER

## 2022-12-24 PROCEDURE — 99213 OFFICE O/P EST LOW 20 MIN: CPT | Mod: S$GLB,,, | Performed by: NURSE PRACTITIONER

## 2022-12-24 PROCEDURE — 1159F MED LIST DOCD IN RCRD: CPT | Mod: CPTII,S$GLB,, | Performed by: NURSE PRACTITIONER

## 2022-12-24 PROCEDURE — 99213 PR OFFICE/OUTPT VISIT, EST, LEVL III, 20-29 MIN: ICD-10-PCS | Mod: S$GLB,,, | Performed by: NURSE PRACTITIONER

## 2022-12-24 PROCEDURE — 3077F SYST BP >= 140 MM HG: CPT | Mod: CPTII,S$GLB,, | Performed by: NURSE PRACTITIONER

## 2022-12-24 RX ORDER — OMEPRAZOLE 20 MG/1
20 CAPSULE, DELAYED RELEASE ORAL DAILY
Qty: 30 CAPSULE | Refills: 0 | Status: SHIPPED | OUTPATIENT
Start: 2022-12-24 | End: 2023-03-30 | Stop reason: SDUPTHER

## 2022-12-24 RX ORDER — METOPROLOL SUCCINATE 25 MG/1
25 TABLET, EXTENDED RELEASE ORAL DAILY
Qty: 30 TABLET | Refills: 0 | Status: SHIPPED | OUTPATIENT
Start: 2022-12-24 | End: 2023-03-30 | Stop reason: SDUPTHER

## 2022-12-24 NOTE — PROGRESS NOTES
"Subjective:       Patient ID: Nate Torre is a 47 y.o. male.    Vitals:  height is 5' 8" (1.727 m) and weight is 84.8 kg (187 lb). His temperature is 98.1 °F (36.7 °C). His blood pressure is 166/109 (abnormal) and his pulse is 79. His respiration is 16 and oxygen saturation is 98%.     Chief Complaint: Medication Refill    Pt states he take BP meds, but he is running low and going out of country tomorrow. Unable to get in w/ DR. Medication is metoprolol.  Also requesting refill omeprazole      Constitution: Negative for chills and fever.   HENT:  Negative for congestion.    Cardiovascular:  Negative for chest pain.   Respiratory:  Negative for cough and shortness of breath.    Gastrointestinal:  Negative for abdominal pain, nausea, vomiting and diarrhea.   Skin:  Negative for rash.   Neurological:  Negative for dizziness, facial drooping, coordination disturbances, headaches, disorientation, altered mental status, numbness and tingling.   Psychiatric/Behavioral:  Negative for altered mental status and disorientation.      Objective:      Physical Exam   Constitutional: He is oriented to person, place, and time. He appears well-developed.  Non-toxic appearance. He does not appear ill. No distress.   HENT:   Head: Normocephalic and atraumatic.   Nose: Nose normal.   Mouth/Throat: Oropharynx is clear and moist.   Eyes: Conjunctivae and EOM are normal. Pupils are equal, round, and reactive to light.   Cardiovascular: Normal rate and regular rhythm.   Pulmonary/Chest: Effort normal. No respiratory distress.   Abdominal: Normal appearance.   Neurological: no focal deficit. He is alert and oriented to person, place, and time.   Skin: Skin is warm and dry. Capillary refill takes 2 to 3 seconds.   Psychiatric: His behavior is normal. Mood normal.   Nursing note and vitals reviewed.      Assessment:       1. History of hypertension    2. Elevated blood pressure reading    3. Encounter for medication refill          Plan: "         History of hypertension  -     metoprolol succinate (TOPROL-XL) 25 MG 24 hr tablet; Take 1 tablet (25 mg total) by mouth once daily.  Dispense: 30 tablet; Refill: 0    Elevated blood pressure reading  -     metoprolol succinate (TOPROL-XL) 25 MG 24 hr tablet; Take 1 tablet (25 mg total) by mouth once daily.  Dispense: 30 tablet; Refill: 0    Encounter for medication refill  -     metoprolol succinate (TOPROL-XL) 25 MG 24 hr tablet; Take 1 tablet (25 mg total) by mouth once daily.  Dispense: 30 tablet; Refill: 0  -     omeprazole (PRILOSEC) 20 MG capsule; Take 1 capsule (20 mg total) by mouth once daily.  Dispense: 30 capsule; Refill: 0

## 2023-03-02 ENCOUNTER — PATIENT MESSAGE (OUTPATIENT)
Dept: RESEARCH | Facility: HOSPITAL | Age: 48
End: 2023-03-02
Payer: COMMERCIAL

## 2023-03-30 ENCOUNTER — LAB VISIT (OUTPATIENT)
Dept: LAB | Facility: HOSPITAL | Age: 48
End: 2023-03-30
Attending: NURSE PRACTITIONER

## 2023-03-30 ENCOUNTER — OFFICE VISIT (OUTPATIENT)
Dept: CARDIOLOGY | Facility: CLINIC | Age: 48
End: 2023-03-30

## 2023-03-30 VITALS
OXYGEN SATURATION: 98 % | HEART RATE: 74 BPM | SYSTOLIC BLOOD PRESSURE: 160 MMHG | DIASTOLIC BLOOD PRESSURE: 94 MMHG | WEIGHT: 189.63 LBS | HEIGHT: 68 IN | BODY MASS INDEX: 28.74 KG/M2

## 2023-03-30 DIAGNOSIS — Z86.79 HISTORY OF HYPERTENSION: ICD-10-CM

## 2023-03-30 DIAGNOSIS — E78.5 HYPERLIPIDEMIA, UNSPECIFIED HYPERLIPIDEMIA TYPE: ICD-10-CM

## 2023-03-30 DIAGNOSIS — I25.810 CORONARY ARTERY DISEASE INVOLVING CORONARY BYPASS GRAFT OF NATIVE HEART WITHOUT ANGINA PECTORIS: ICD-10-CM

## 2023-03-30 DIAGNOSIS — Z76.0 ENCOUNTER FOR MEDICATION REFILL: ICD-10-CM

## 2023-03-30 DIAGNOSIS — R03.0 ELEVATED BLOOD PRESSURE READING: ICD-10-CM

## 2023-03-30 DIAGNOSIS — I10 HYPERTENSION, UNSPECIFIED TYPE: Primary | ICD-10-CM

## 2023-03-30 LAB
CHOLEST SERPL-MCNC: 233 MG/DL (ref 120–199)
CHOLEST/HDLC SERPL: 4.3 {RATIO} (ref 2–5)
HDLC SERPL-MCNC: 54 MG/DL (ref 40–75)
HDLC SERPL: 23.2 % (ref 20–50)
LDLC SERPL CALC-MCNC: 162.2 MG/DL (ref 63–159)
NONHDLC SERPL-MCNC: 179 MG/DL
TRIGL SERPL-MCNC: 84 MG/DL (ref 30–150)

## 2023-03-30 PROCEDURE — 80061 LIPID PANEL: CPT | Performed by: NURSE PRACTITIONER

## 2023-03-30 PROCEDURE — 93005 ELECTROCARDIOGRAM TRACING: CPT | Mod: PBBFAC,PN | Performed by: SPECIALIST

## 2023-03-30 PROCEDURE — 93010 EKG 12-LEAD: ICD-10-PCS | Mod: S$PBB,,, | Performed by: SPECIALIST

## 2023-03-30 PROCEDURE — 99214 OFFICE O/P EST MOD 30 MIN: CPT | Mod: PBBFAC,PN

## 2023-03-30 PROCEDURE — 36415 COLL VENOUS BLD VENIPUNCTURE: CPT | Performed by: NURSE PRACTITIONER

## 2023-03-30 PROCEDURE — 99214 OFFICE O/P EST MOD 30 MIN: CPT | Mod: S$PBB,,,

## 2023-03-30 PROCEDURE — 93010 ELECTROCARDIOGRAM REPORT: CPT | Mod: S$PBB,,, | Performed by: SPECIALIST

## 2023-03-30 PROCEDURE — 99999 PR PBB SHADOW E&M-EST. PATIENT-LVL IV: ICD-10-PCS | Mod: PBBFAC,,,

## 2023-03-30 PROCEDURE — 99214 PR OFFICE/OUTPT VISIT, EST, LEVL IV, 30-39 MIN: ICD-10-PCS | Mod: S$PBB,,,

## 2023-03-30 PROCEDURE — 99999 PR PBB SHADOW E&M-EST. PATIENT-LVL IV: CPT | Mod: PBBFAC,,,

## 2023-03-30 RX ORDER — AMLODIPINE AND BENAZEPRIL HYDROCHLORIDE 5; 20 MG/1; MG/1
1 CAPSULE ORAL DAILY
Qty: 90 CAPSULE | Refills: 3 | Status: SHIPPED | OUTPATIENT
Start: 2023-03-30

## 2023-03-30 RX ORDER — ATORVASTATIN CALCIUM 40 MG/1
40 TABLET, FILM COATED ORAL DAILY
Qty: 90 TABLET | Refills: 3 | Status: SHIPPED | OUTPATIENT
Start: 2023-03-30

## 2023-03-30 RX ORDER — METOPROLOL SUCCINATE 25 MG/1
25 TABLET, EXTENDED RELEASE ORAL DAILY
Qty: 90 TABLET | Refills: 3 | Status: SHIPPED | OUTPATIENT
Start: 2023-03-30

## 2023-03-30 RX ORDER — OMEPRAZOLE 20 MG/1
20 CAPSULE, DELAYED RELEASE ORAL DAILY
Qty: 90 CAPSULE | Refills: 3 | Status: SHIPPED | OUTPATIENT
Start: 2023-03-30

## 2023-03-30 RX ORDER — ASPIRIN 81 MG/1
81 TABLET ORAL ONCE
Qty: 90 TABLET | Refills: 3 | Status: SHIPPED | OUTPATIENT
Start: 2023-03-30 | End: 2023-03-30

## 2023-03-30 RX ORDER — CLONIDINE HYDROCHLORIDE 0.1 MG/1
TABLET ORAL
Qty: 30 TABLET | Refills: 0 | Status: SHIPPED | OUTPATIENT
Start: 2023-03-30

## 2023-03-30 NOTE — ASSESSMENT & PLAN NOTE
Recent .  Patient has not been taking atorvastatin 40 mg daily.  Patient to start taking daily.  Continue low-sodium heart healthy diet.

## 2023-03-30 NOTE — ASSESSMENT & PLAN NOTE
Patient had CABG x 6 2016.  Patient denies chest pain or anginal equivalent symptoms.  He is active.   Blood pressure today in office is 160/94.  Patient has been out of his medication.  The past 3-4 days.  Will hold off on adjusting his blood pressure medications at this time.  Patient to check his blood pressure twice a day for week and to notify me of blood pressure is remaining greater than 130/80.    Recommend low-sodium heart healthy diet.  Patient has also not been taking his atorvastatin daily.  Recommend compliant with taking atorvastatin every single day.

## 2023-03-30 NOTE — PROGRESS NOTES
Subjective:    Patient ID:  Nate Torre is a 47 y.o. male patient here for evaluation Follow-up (1 year )      History of Present Illness:     Is here for a checkup.  He denies chest pain, palpitations, shortness of breath, lightheadedness, dizziness, edema or bleeding.  He has been out of his blood pressure medications for the past 3 days.  Blood pressure today in office is 160/94.  The only medication he was taking for blood pressure was metoprolol 25 mg daily.    Patient is eating a low-sodium heart healthy diet.  He works daily.  He tolerates exercise and remains active.  He denies smoking, drug use.  He drinks socially on occasion.        Review of patient's allergies indicates:  No Known Allergies    Past Medical History:   Diagnosis Date    Coronary artery disease     Hypertension      Past Surgical History:   Procedure Laterality Date    CORONARY ARTERY BYPASS GRAFT       Social History     Tobacco Use    Smoking status: Former    Smokeless tobacco: Former   Substance Use Topics    Alcohol use: Yes     Alcohol/week: 1.0 standard drink     Types: 1 Cans of beer per week     Comment: 7 cans a week    Drug use: Never        Review of Systems:    As noted in HPI in addition      REVIEW OF SYSTEMS  CARDIOVASCULAR: No recent chest pain, palpitations, arm, neck, or jaw pain  RESPIRATORY: No recent fever, cough chills, SOB or congestion  : No blood in the urine  GI: No Nausea, vomiting, constipation, diarrhea, blood, or reflux.  MUSCULOSKELETAL: No myalgias  NEURO: No lightheadedness or dizziness  EYES: No Double vision, blurry, vision or headache              Objective        Vitals:    03/30/23 0922   BP: (!) 160/94   Pulse:        LIPIDS - LAST 2   Lab Results   Component Value Date    CHOL 233 (H) 03/30/2023    HDL 54 03/30/2023    LDLCALC 162.2 (H) 03/30/2023    TRIG 84 03/30/2023    CHOLHDL 23.2 03/30/2023       CBC - LAST 2  No results found for: WBC, RBC, HGB, HCT, MCV, MCH, MCHC, RDW, PLT, MPV,  GRAN, LYMPH, MONO, BASO, NRBC    CHEMISTRY & LIVER FUNCTION - LAST 2  No results found for: NA, K, CL, CO2, ANIONGAP, BUN, CREATININE, GLU, CALCIUM, PH, MG, ALBUMIN, PROT, ALKPHOS, ALT, AST, BILITOT     CARDIAC PROFILE - LAST 2  No results found for: BNP, CPK, CPKMB, LDH, TROPONINI, TROPONINIHS     COAGULATION - LAST 2  No results found for: LABPT, INR, APTT    ENDOCRINE & PSA - LAST 2  No results found for: HGBA1C, MICROALBUR, TSH, PROCAL, PSA     ECHOCARDIOGRAM RESULTS  No results found for this or any previous visit.      CURRENT/PREVIOUS VISIT EKG  No results found for this or any previous visit.  No valid procedures specified.   Results for orders placed during the hospital encounter of 10/11/21    Exercise Stress - EKG    Interpretation Summary    The EKG portion of this study is abnormal but not diagnostic.    The patient reported no chest pain during the stress test.    The blood pressure response to stress was normal.    There were no arrhythmias during stress.    Recommend a myocardial perfusion imaging study to enhance the specificity and sensitivity of the test    No valid procedures specified.    PHYSICAL EXAM  CONSTITUTIONAL: Well built, well nourished in no apparent distress  NECK: no carotid bruit, no JVD  LUNGS: CTA  CHEST WALL: no tenderness  HEART: regular rate and rhythm, S1, S2 normal, no murmur, click, rub or gallop   ABDOMEN: soft, non-tender; bowel sounds normal  EXTREMITIES: Extremities normal, no edema  NEURO: AAO X 3    I HAVE REVIEWED :    The vital signs, nurses notes, and all the pertinent radiology and labs.        Current Outpatient Medications   Medication Instructions    amlodipine-benazepril 5-20 mg (LOTREL) 5-20 mg per capsule 1 capsule, Oral, Daily    aspirin (ECOTRIN) 81 mg, Oral, Once    atorvastatin (LIPITOR) 40 mg, Oral, Daily    cloNIDine (CATAPRES) 0.1 MG tablet TAKE 1 TABLET BY MOUTH ONCE DAILY AS NEEDED FOR SYSTOLIC BLOOD PRESSURE OVER 170    hydrocortisone 2.5 % cream  Topical (Top), 2 times daily    metoprolol succinate (TOPROL-XL) 25 mg, Oral, Daily    omeprazole (PRILOSEC) 20 mg, Oral, Daily          Assessment & Plan     Coronary artery disease involving coronary bypass graft of native heart without angina pectoris  Patient had CABG x 6 2016.  Patient denies chest pain or anginal equivalent symptoms.  He is active.   Blood pressure today in office is 160/94.  Patient has been out of his medication.  The past 3-4 days.  Will hold off on adjusting his blood pressure medications at this time.  Patient to check his blood pressure twice a day for week and to notify me of blood pressure is remaining greater than 130/80.    Recommend low-sodium heart healthy diet.  Patient has also not been taking his atorvastatin daily.  Recommend compliant with taking atorvastatin every single day.      Hyperlipidemia  Recent .  Patient has not been taking atorvastatin 40 mg daily.  Patient to start taking daily.  Continue low-sodium heart healthy diet.    Hypertension  Blood pressure today in office 160/94.  Patient has been on medications for 3-4 days.  Continue current medication regimen.  Notify me if blood pressure remains greater than 130/80 consistently.  Recommend low-sodium are healthy diet.  Reduce stressors.  Reduce caffeine intake.    Encounter for medication refill  He is out of his medications at this time.  Refilled all medications this visit.          Follow up in about 3 months (around 6/30/2023).

## 2023-03-30 NOTE — ASSESSMENT & PLAN NOTE
Blood pressure today in office 160/94.  Patient has been on medications for 3-4 days.  Continue current medication regimen.  Notify me if blood pressure remains greater than 130/80 consistently.  Recommend low-sodium are healthy diet.  Reduce stressors.  Reduce caffeine intake.

## 2023-04-21 DIAGNOSIS — M25.561 PAIN IN BOTH KNEES, UNSPECIFIED CHRONICITY: Primary | ICD-10-CM

## 2023-04-21 DIAGNOSIS — M25.562 PAIN IN BOTH KNEES, UNSPECIFIED CHRONICITY: Primary | ICD-10-CM

## 2023-05-22 ENCOUNTER — HOSPITAL ENCOUNTER (EMERGENCY)
Facility: HOSPITAL | Age: 48
Discharge: HOME | End: 2023-05-22
Attending: EMERGENCY MEDICINE

## 2023-05-22 ENCOUNTER — OFFICE VISIT (OUTPATIENT)
Dept: ORTHOPEDICS | Facility: CLINIC | Age: 48
End: 2023-05-22
Payer: COMMERCIAL

## 2023-05-22 ENCOUNTER — TELEPHONE (OUTPATIENT)
Dept: ORTHOPEDICS | Facility: CLINIC | Age: 48
End: 2023-05-22

## 2023-05-22 VITALS
TEMPERATURE: 99 F | DIASTOLIC BLOOD PRESSURE: 90 MMHG | SYSTOLIC BLOOD PRESSURE: 148 MMHG | HEART RATE: 80 BPM | HEIGHT: 68 IN | RESPIRATION RATE: 18 BRPM | OXYGEN SATURATION: 100 % | BODY MASS INDEX: 27.28 KG/M2 | WEIGHT: 180 LBS

## 2023-05-22 VITALS — HEIGHT: 68 IN | WEIGHT: 179.88 LBS | BODY MASS INDEX: 27.26 KG/M2

## 2023-05-22 DIAGNOSIS — M70.21 OLECRANON BURSITIS OF RIGHT ELBOW: Primary | ICD-10-CM

## 2023-05-22 DIAGNOSIS — M25.729 OLECRANON BONE SPUR: ICD-10-CM

## 2023-05-22 DIAGNOSIS — M25.562 ACUTE PAIN OF LEFT KNEE: Primary | ICD-10-CM

## 2023-05-22 DIAGNOSIS — R60.9 SWELLING: ICD-10-CM

## 2023-05-22 DIAGNOSIS — R52 PAIN: ICD-10-CM

## 2023-05-22 DIAGNOSIS — G89.29 CHRONIC PAIN OF LEFT KNEE: ICD-10-CM

## 2023-05-22 DIAGNOSIS — S83.242A TEAR OF MEDIAL MENISCUS OF LEFT KNEE, CURRENT, INITIAL ENCOUNTER: Primary | ICD-10-CM

## 2023-05-22 DIAGNOSIS — M70.21 OLECRANON BURSITIS OF RIGHT ELBOW: ICD-10-CM

## 2023-05-22 DIAGNOSIS — M25.562 CHRONIC PAIN OF LEFT KNEE: ICD-10-CM

## 2023-05-22 PROCEDURE — 99214 PR OFFICE/OUTPT VISIT, EST, LEVL IV, 30-39 MIN: ICD-10-PCS | Mod: S$GLB,,, | Performed by: ORTHOPAEDIC SURGERY

## 2023-05-22 PROCEDURE — 99284 EMERGENCY DEPT VISIT MOD MDM: CPT

## 2023-05-22 PROCEDURE — 99999 PR PBB SHADOW E&M-EST. PATIENT-LVL III: CPT | Mod: PBBFAC,,, | Performed by: ORTHOPAEDIC SURGERY

## 2023-05-22 PROCEDURE — 99214 OFFICE O/P EST MOD 30 MIN: CPT | Mod: S$GLB,,, | Performed by: ORTHOPAEDIC SURGERY

## 2023-05-22 PROCEDURE — 99999 PR PBB SHADOW E&M-EST. PATIENT-LVL III: ICD-10-PCS | Mod: PBBFAC,,, | Performed by: ORTHOPAEDIC SURGERY

## 2023-05-22 RX ORDER — DICLOFENAC SODIUM 75 MG/1
75 TABLET, DELAYED RELEASE ORAL 2 TIMES DAILY
Qty: 14 TABLET | Refills: 0 | Status: SHIPPED | OUTPATIENT
Start: 2023-05-22

## 2023-05-22 RX ORDER — IBUPROFEN 600 MG/1
600 TABLET ORAL EVERY 6 HOURS PRN
COMMUNITY
Start: 2023-04-15

## 2023-05-22 NOTE — TELEPHONE ENCOUNTER
----- Message from Mamadou Valdivia sent at 5/22/2023 12:14 PM CDT -----  Regarding: [facility] ED f/u from 5/22, call pt   Contact: pt   [facility] ED f/u from 5/22, call pt

## 2023-05-22 NOTE — PROGRESS NOTES
CC:  47-year-old male presents for evaluation of right elbow and left knee.    The patient complains of pain in the right elbow the rates as a 4/10.  He states for the last 2 months he is had pain and swelling in the right elbow.  He states the pain increases in his right elbow with pressure.    With regards to the left knee he rates the pain as an 8/10.  He states that about 2 months ago he was getting an dressing in the shower and he felt and heard a pop or crack in his left knee.  He would immediate onset of pain and inability to bear weight.  He also states that the knee swelled and he had a huge effusion.  The effusion has progressively gotten better over time and he has hobbled around on the knee for 2 months now.  Although the swelling has gone the pain has persisted.  He reports continued popping and buckling of the knee with feelings of giving way.  Left knee pain increases with flexing the knee and squatting.    He is tried ibuprofen for both of these pains but has gotten no relief.    Past Medical History:   Diagnosis Date    Coronary artery disease     Hypertension        Past Surgical History:   Procedure Laterality Date    CORONARY ARTERY BYPASS GRAFT         Current Outpatient Medications on File Prior to Visit   Medication Sig Dispense Refill    amlodipine-benazepril 5-20 mg (LOTREL) 5-20 mg per capsule Take 1 capsule by mouth once daily. 90 capsule 3    atorvastatin (LIPITOR) 40 MG tablet Take 1 tablet (40 mg total) by mouth once daily. 90 tablet 3    cloNIDine (CATAPRES) 0.1 MG tablet TAKE 1 TABLET BY MOUTH ONCE DAILY AS NEEDED FOR SYSTOLIC BLOOD PRESSURE OVER 170 30 tablet 0    diclofenac (VOLTAREN) 75 MG EC tablet Take 1 tablet (75 mg total) by mouth 2 (two) times daily. 14 tablet 0    hydrocortisone 2.5 % cream Apply topically 2 (two) times daily. 28 g 0    metoprolol succinate (TOPROL-XL) 25 MG 24 hr tablet Take 1 tablet by mouth once daily 7 tablet 0    metoprolol succinate (TOPROL-XL) 25 MG  24 hr tablet Take 1 tablet (25 mg total) by mouth once daily. 90 tablet 3    omeprazole (PRILOSEC) 20 MG capsule Take 1 capsule by mouth once daily 7 capsule 0    omeprazole (PRILOSEC) 20 MG capsule Take 1 capsule (20 mg total) by mouth once daily. 90 capsule 3    aspirin (ECOTRIN) 81 MG EC tablet Take 1 tablet (81 mg total) by mouth once. for 1 dose 90 tablet 3     No current facility-administered medications on file prior to visit.       ROS:    Constitution: Denies chills, fever, and sweats.  HENT: Denies headaches or blurry vision.  Cardiovascular: Denies chest pain or irregular heart beat.  Respiratory: Denies cough or shortness of breath.  Gastrointestinal: Denies abdominal pain, nausea, or vomiting.  Genitourinary:  Denies urinary incontinence, bladder and kidney issues  Musculoskeletal:  Denies muscle cramps.  Positive for left knee and right elbow pain.  Neurological: Denies dizziness or focal weakness.  Psychiatric/Behavioral: Normal mental status.  Hematologic/Lymphatic: Denies bleeding problem or easy bruising/bleeding.  Skin: Denies rash or suspicious lesions.    Physical examination     Gen - No acute distress, well nourished, well groomed   Eyes - Extraoccular motions intact, pupils equally round and reactive to light and accommodation   ENT - normocephalic, atruamtic, oropharynx clear   Neck - Supple, no abnormal masses   Cardiovascular - regular rate and rhythm   Pulmonary - clear to auscultation bilaterally, no wheezes, ronchi, or rales   Abdomen - soft, non-tender, non-distended, positive bowel sounds   Psych - The patient is alert and oriented x3 with normal mood and affect    Right Upper Extremity Examination     Skin is intact throughout   Motor is intact distally radial, median, ulnar, AIN, PIN   +2 radial and ulnar pulses   Sensation to light touch is intact distally radial, median, and ulnar     Right Elbow Exam:     ROM - 0-150   Medial tenderness - negative  Lateral tenderness -  negative  Distal biceps tenderness - negative  Triceps tenderness - positive   Instability on exam - negative  Tinell's at the elbow - negative  Swelling - positive  Ecchymosis - negative    Examination of the Left Lower Extremity:     Skin intact throughout.  Motor function is intact distally EHL/FHL/TA/cory   +2 dorsalis pedis and posterior tibial pulses   Sensation to light touch intact distally dorsal, plantar, and first web space     Examination of the Left knee:    ROM 0 - 150   Effusion positive  Tenderness to palpation at the joint line positive  Pain during range of motion positive  Crepitation during range of motion negative     positive increased pain noted with flexion past 90   positive antalgic gait noted   negative Lachman's Test   negative Anterior Drawer Test   negative Posterior Drawer Test   positive McMurrays Test   positive Disco Test   negative Varus/Valgus instability    X-ray images were examined and personally interpreted by me.  Three views the right elbow dated 05/22/2023 show a calcification off of the olecranon of the right elbow were the distal triceps inserts.  This appears to be a osteophyte off of the olecranon and it appears to have fractured off of the olecranon.  There is some soft tissue swelling overlying that bony prominence.    Three views of the left knee dated 05/22/2023 shows well-maintained joint space with no advanced arthritic changes and no acute fractures.  Of note there is some vascular clips in the soft tissue.    Dx:  Olecranon bursitis secondary to a fractured osteophyte off of the right olecranon.  Tear of the medial meniscus of the left knee    Plan:  Recommendations for an MRI of the left knee.  With regards to the right elbow I did offer to excise the bony fragment from the olecranon and triceps insertion and that should relieve the bursitis.  He is considering that.  As far as his knee goes we will follow him up after the MRI is complete.

## 2023-05-22 NOTE — ED NOTES
Adult Physical Assessment  LOC: Nate Torre, 47 y.o. male verified via two identifiers.  The patient is awake, alert, oriented and speaking appropriately at this time.  APPEARANCE: Patient resting comfortably and appears to be in no acute distress at this time. Patient is clean and well groomed, patient's clothing is properly fastened.  SKIN:The skin is warm and dry, color consistent with ethnicity, patient has normal skin turgor and moist mucus membranes, skin intact, no breakdown or brusing noted.  MUSCULOSKELETAL: Patient moving all extremities well, no obvious swelling or deformities noted.  RESPIRATORY: Airway is open and patent, respirations are spontaneous, patient has a normal effort and rate, no accessory muscle use noted.  CARDIAC: Patient has a normal rate and rhythm, no periphreal edema noted in any extremity, capillary refill < 3 seconds in all extremities  ABDOMEN: Soft and non tender to palpation, no abdominal distention noted. Bowel sounds present in all four quadrants.  NEUROLOGIC: Eyes open spontaneously, behavior appropriate to situation, follows commands, facial expression symmetrical, bilateral hand grasp equal and even, purposeful motor response noted, normal sensation in all extremities when touched with a finger.

## 2023-05-22 NOTE — ED PROVIDER NOTES
Encounter Date: 5/22/2023       History     Chief Complaint   Patient presents with    Elbow Pain     RT X 2 MOS, SWELLING    Knee Pain     LEFT, X 1.5 MOS     Patient presents emergency department reported right elbow swelling and pain over last 2 months he denies any previous history denies any injury to the elbow he also complains of left knee pain states initially had significant swelling of the left knee that that has improved he does report that he felt a popping sensation and bending any trying to remove his pants prior to onset of the swelling states he is seen at orthopedist about his other knee but not the left knee is no noted trauma to this area as well      Review of patient's allergies indicates:  No Known Allergies  Past Medical History:   Diagnosis Date    Coronary artery disease     Hypertension      Past Surgical History:   Procedure Laterality Date    CORONARY ARTERY BYPASS GRAFT       No family history on file.  Social History     Tobacco Use    Smoking status: Former    Smokeless tobacco: Former   Substance Use Topics    Alcohol use: Yes     Alcohol/week: 1.0 standard drink     Types: 1 Cans of beer per week     Comment: 7 cans a week    Drug use: Never     Review of Systems   Constitutional:  Negative for chills and fever.   Musculoskeletal:  Positive for arthralgias and joint swelling.   All other systems reviewed and are negative.    Physical Exam     Initial Vitals [05/22/23 1013]   BP Pulse Resp Temp SpO2   (!) 184/116 80 16 98.5 °F (36.9 °C) 98 %      MAP       --         Physical Exam    Constitutional: He appears well-developed and well-nourished. No distress.   HENT:   Head: Normocephalic and atraumatic.   Right Ear: External ear normal.   Left Ear: External ear normal.   Mouth/Throat: Oropharynx is clear and moist.   Eyes: EOM are normal. Pupils are equal, round, and reactive to light.   Neck: Neck supple.   Normal range of motion.  Cardiovascular:  Normal rate, regular rhythm, S1  normal, S2 normal and intact distal pulses.           Musculoskeletal:         General: Normal range of motion.      Cervical back: Normal range of motion and neck supple.      Comments: Right elbow with mild swelling to the olecranon bursa no significant tenderness no erythema no induration there is mild swelling to left knee with no palpable will tenderness no erythema no significant effusion noted full range of motion to all extremities     Neurological: He is alert and oriented to person, place, and time. He has normal strength. GCS score is 15. GCS eye subscore is 4. GCS verbal subscore is 5. GCS motor subscore is 6.   Skin: Skin is warm and dry. Capillary refill takes less than 2 seconds. No rash noted. No erythema.   Psychiatric: He has a normal mood and affect. His behavior is normal.       ED Course   Procedures  Labs Reviewed - No data to display       Imaging Results              X-Ray Elbow Complete Right (Final result)  Result time 05/22/23 10:53:54      Final result by Steve Madden MD (05/22/23 10:53:54)                   Narrative:    Right elbow 4 views    CLINICAL DATA: Right elbow swelling    FINDINGS: 4 views are negative for fracture or dislocation. No acute osseous destructive process is identified.    There is soft tissue swelling over the olecranon process suggesting possible olecranon bursitis. Calcification adjacent to the olecranon process could be on the basis of the enthesophyte formation or bursal calcification.    IMPRESSION:  1. No acute osseous abnormalities.  2. Soft tissue swelling over the olecranon bursa suggesting olecranon bursitis. 14 mm calcification could be on the basis of enthesophyte formation or bursal calcification.    Electronically signed by:  Steve Madden MD  5/22/2023 10:53 AM CDT Workstation: [id number]                                     X-Ray Knee 3 View Left (Final result)  Result time 05/22/23 10:48:32      Final result by Man Martinez MD  (05/22/23 10:48:32)                   Narrative:    Reason: Left knee pain and swelling.    FINDINGS:    3 views of the left knee show no fracture, dislocation, or destructive osseous lesion. The joint spaces of the left knee are within normal limits. Patellar enthesopathy noted. No knee joint effusion. No gross soft tissue abnormality. Surgical clips are noted in the posterior medial knee soft tissues.    IMPRESSION:    Patellar enthesopathy. No acute osseous abnormality.    Electronically signed by:  Man Martinez DO  5/22/2023 10:48 AM CDT Workstation: [id number]                                     Medications - No data to display  Medical Decision Making:   ED Management:  Radiographs show no evidence of fracture dislocation significant joint effusions advised patient these findings recommendations for outpatient follow-up with orthopedist return to emergency department for worsened symptoms or new symptoms will prescribe diclofenac as an outpatient referral to Dr. Leach further evaluation treatment                        Clinical Impression:   Final diagnoses:  [R60.9] Swelling  [R52] Pain  [M70.21] Olecranon bursitis of right elbow (Primary)  [M25.562, G89.29] Chronic pain of left knee        ED Disposition Condition    Discharge Stable          ED Prescriptions       Medication Sig Dispense Start Date End Date Auth. Provider    diclofenac (VOLTAREN) 75 MG EC tablet Take 1 tablet (75 mg total) by mouth 2 (two) times daily. 14 tablet 5/22/2023 -- Ciro Epps MD          Follow-up Information       Follow up With Specialties Details Why Contact Info    Dontae Leach II, MD Orthopedic Surgery Call in 1 day for re-examination of your symptoms [address] [place] [address] DR Kerline LARIOS [id number]  [phone]               Ciro Epps MD  05/22/23 2558

## 2023-05-24 ENCOUNTER — HOSPITAL ENCOUNTER (OUTPATIENT)
Dept: RADIOLOGY | Facility: HOSPITAL | Age: 48
Discharge: HOME | End: 2023-05-24
Attending: ORTHOPAEDIC SURGERY
Payer: COMMERCIAL

## 2023-05-24 DIAGNOSIS — M25.562 ACUTE PAIN OF LEFT KNEE: ICD-10-CM

## 2023-05-24 PROCEDURE — 73721 MRI KNEE WITHOUT CONTRAST LEFT: ICD-10-PCS | Mod: 26,LT,, | Performed by: RADIOLOGY

## 2023-05-24 PROCEDURE — 73721 MRI JNT OF LWR EXTRE W/O DYE: CPT | Mod: 26,LT,, | Performed by: RADIOLOGY

## 2023-05-24 PROCEDURE — 73721 MRI JNT OF LWR EXTRE W/O DYE: CPT | Mod: TC,LT

## 2023-05-30 ENCOUNTER — OFFICE VISIT (OUTPATIENT)
Dept: ORTHOPEDICS | Facility: CLINIC | Age: 48
End: 2023-05-30

## 2023-05-30 VITALS — BODY MASS INDEX: 27.26 KG/M2 | WEIGHT: 179.88 LBS | HEIGHT: 68 IN

## 2023-05-30 DIAGNOSIS — S83.242A ACUTE TEAR MEDIAL MENISCUS, LEFT, INITIAL ENCOUNTER: Primary | ICD-10-CM

## 2023-05-30 PROCEDURE — 20610 DRAIN/INJ JOINT/BURSA W/O US: CPT | Mod: PBBFAC,PO | Performed by: ORTHOPAEDIC SURGERY

## 2023-05-30 PROCEDURE — 99214 OFFICE O/P EST MOD 30 MIN: CPT | Mod: S$PBB,25,, | Performed by: ORTHOPAEDIC SURGERY

## 2023-05-30 PROCEDURE — 99999 PR PBB SHADOW E&M-EST. PATIENT-LVL III: ICD-10-PCS | Mod: PBBFAC,,, | Performed by: ORTHOPAEDIC SURGERY

## 2023-05-30 PROCEDURE — 20610 LARGE JOINT ASPIRATION/INJECTION: L KNEE: ICD-10-PCS | Mod: S$PBB,LT,, | Performed by: ORTHOPAEDIC SURGERY

## 2023-05-30 PROCEDURE — 99999 PR PBB SHADOW E&M-EST. PATIENT-LVL III: CPT | Mod: PBBFAC,,, | Performed by: ORTHOPAEDIC SURGERY

## 2023-05-30 PROCEDURE — 99214 PR OFFICE/OUTPT VISIT, EST, LEVL IV, 30-39 MIN: ICD-10-PCS | Mod: S$PBB,25,, | Performed by: ORTHOPAEDIC SURGERY

## 2023-05-30 PROCEDURE — 20610 DRAIN/INJ JOINT/BURSA W/O US: CPT | Mod: PBBFAC,PO,LT | Performed by: ORTHOPAEDIC SURGERY

## 2023-05-30 RX ORDER — METHYLPREDNISOLONE ACETATE 80 MG/ML
80 INJECTION, SUSPENSION INTRA-ARTICULAR; INTRALESIONAL; INTRAMUSCULAR; SOFT TISSUE
Status: DISCONTINUED | OUTPATIENT
Start: 2023-05-30 | End: 2023-05-30 | Stop reason: HOSPADM

## 2023-05-30 RX ADMIN — METHYLPREDNISOLONE ACETATE 80 MG: 80 INJECTION, SUSPENSION INTRA-ARTICULAR; INTRALESIONAL; INTRAMUSCULAR; SOFT TISSUE at 09:05

## 2023-05-30 NOTE — PROCEDURES
Large Joint Aspiration/Injection: L knee    Date/Time: 5/30/2023 9:30 AM  Performed by: Dontae Leach II, MD  Authorized by: Dontae Leach II, MD     Consent Done?:  Yes (Verbal)  Indications:  Pain  Timeout: prior to procedure the correct patient, procedure, and site was verified      Local anesthesia used?: Yes    Local anesthetic:  Topical anesthetic    Details:  Needle Size:  22 G  Approach:  Anteromedial  Location:  Knee  Site:  L knee  Medications:  80 mg methylPREDNISolone acetate 80 mg/mL  Patient tolerance:  Patient tolerated the procedure well with no immediate complications

## 2023-05-30 NOTE — PROGRESS NOTES
CC:  47-year-old male follows up with left knee pain.  The patient has had pain in the left knee for about 2 months.  He felt and heard a pop in his left knee about 2 months ago and has had pain and mechanical symptoms since that time.  He currently reports his pain as a 7/10.    Past Medical History:   Diagnosis Date    Coronary artery disease     Hypertension        Past Surgical History:   Procedure Laterality Date    CORONARY ARTERY BYPASS GRAFT         Current Outpatient Medications on File Prior to Visit   Medication Sig Dispense Refill    amlodipine-benazepril 5-20 mg (LOTREL) 5-20 mg per capsule Take 1 capsule by mouth once daily. 90 capsule 3    aspirin (ECOTRIN) 81 MG EC tablet Take 1 tablet (81 mg total) by mouth once. for 1 dose 90 tablet 3    atorvastatin (LIPITOR) 40 MG tablet Take 1 tablet (40 mg total) by mouth once daily. 90 tablet 3    cloNIDine (CATAPRES) 0.1 MG tablet TAKE 1 TABLET BY MOUTH ONCE DAILY AS NEEDED FOR SYSTOLIC BLOOD PRESSURE OVER 170 30 tablet 0    diclofenac (VOLTAREN) 75 MG EC tablet Take 1 tablet (75 mg total) by mouth 2 (two) times daily. 14 tablet 0    hydrocortisone 2.5 % cream Apply topically 2 (two) times daily. 28 g 0    ibuprofen (ADVIL,MOTRIN) 600 MG tablet Take 600 mg by mouth every 6 (six) hours as needed.      metoprolol succinate (TOPROL-XL) 25 MG 24 hr tablet Take 1 tablet by mouth once daily 7 tablet 0    metoprolol succinate (TOPROL-XL) 25 MG 24 hr tablet Take 1 tablet (25 mg total) by mouth once daily. 90 tablet 3    omeprazole (PRILOSEC) 20 MG capsule Take 1 capsule by mouth once daily 7 capsule 0    omeprazole (PRILOSEC) 20 MG capsule Take 1 capsule (20 mg total) by mouth once daily. 90 capsule 3     No current facility-administered medications on file prior to visit.       ROS:    Constitution: Denies chills, fever, and sweats.  HENT: Denies headaches or blurry vision.  Cardiovascular: Denies chest pain or irregular heart beat.  Respiratory: Denies cough or  shortness of breath.  Gastrointestinal: Denies abdominal pain, nausea, or vomiting.  Genitourinary:  Denies urinary incontinence, bladder and kidney issues  Musculoskeletal:  Denies muscle cramps.  Positive for left knee pain and mechanical symptoms  Neurological: Denies dizziness or focal weakness.  Psychiatric/Behavioral: Normal mental status.  Hematologic/Lymphatic: Denies bleeding problem or easy bruising/bleeding.  Skin: Denies rash or suspicious lesions.    Physical examination     Gen - No acute distress, well nourished, well groomed   Eyes - Extraoccular motions intact, pupils equally round and reactive to light and accommodation   ENT - normocephalic, atruamtic, oropharynx clear   Neck - Supple, no abnormal masses   Cardiovascular - regular rate and rhythm   Pulmonary - clear to auscultation bilaterally, no wheezes, ronchi, or rales   Abdomen - soft, non-tender, non-distended, positive bowel sounds   Psych - The patient is alert and oriented x3 with normal mood and affect    Examination of the Left Lower Extremity:     Skin intact throughout.  Motor function is intact distally EHL/FHL/TA/cory   +2 dorsalis pedis and posterior tibial pulses   Sensation to light touch intact distally dorsal, plantar, and first web space     Examination of the Left knee:    ROM 0 - 150   Effusion negative  Tenderness to palpation at the joint line positive  Pain during range of motion positive  Crepitation during range of motion negative     positive increased pain noted with flexion past 90   negative antalgic gait noted   negative Lachman's Test   negative Anterior Drawer Test   negative Posterior Drawer Test   positive McMurrays Test   positive Disco Test   negative Varus/Valgus instability    MRI images were examined and personally interpreted by me.  MRI of the left knee dated 05/24/2023 shows a tear in the posterior horn of the medial meniscus.    Dx:  Tear of the medial meniscus of the left knee    Plan:  We discussed  treatment options.  The patient states he is not interested in surgery at this time.  I offered him an injection to help relieve his pain and he agreed.  We injected the left knee with a mixture of 2, 2, 1.  He tolerated that well.  Follow up p.r.n..

## 2024-04-07 DIAGNOSIS — I27.20 HYPERTENSIVE PULMONARY VASCULAR DISEASE: Primary | ICD-10-CM

## 2024-04-08 RX ORDER — CLONIDINE HYDROCHLORIDE 0.1 MG/1
TABLET ORAL
Qty: 30 TABLET | Refills: 0 | Status: SHIPPED | OUTPATIENT
Start: 2024-04-08

## 2024-04-08 NOTE — TELEPHONE ENCOUNTER
Last  seen in office 3/30/23 gary/BELLA Toth patient requesting refill    Assigned annual visit  5/21/24 @ 9:00am   mailbox full will message thru My chart about assigned visit for refill

## 2024-04-17 DIAGNOSIS — R03.0 ELEVATED BLOOD PRESSURE READING: ICD-10-CM

## 2024-04-17 DIAGNOSIS — Z86.79 HISTORY OF HYPERTENSION: ICD-10-CM

## 2024-04-17 DIAGNOSIS — Z76.0 ENCOUNTER FOR MEDICATION REFILL: ICD-10-CM

## 2024-04-22 RX ORDER — METOPROLOL SUCCINATE 25 MG/1
25 TABLET, EXTENDED RELEASE ORAL
Qty: 90 TABLET | Refills: 0 | Status: SHIPPED | OUTPATIENT
Start: 2024-04-22

## 2024-04-22 RX ORDER — OMEPRAZOLE 20 MG/1
20 CAPSULE, DELAYED RELEASE ORAL
Qty: 90 CAPSULE | Refills: 0 | Status: SHIPPED | OUTPATIENT
Start: 2024-04-22

## 2024-08-15 ENCOUNTER — OFFICE VISIT (OUTPATIENT)
Dept: CARDIOLOGY | Facility: CLINIC | Age: 49
End: 2024-08-15

## 2024-08-15 VITALS
DIASTOLIC BLOOD PRESSURE: 80 MMHG | SYSTOLIC BLOOD PRESSURE: 130 MMHG | BODY MASS INDEX: 30.14 KG/M2 | HEIGHT: 68 IN | HEART RATE: 78 BPM | WEIGHT: 198.88 LBS | OXYGEN SATURATION: 98 %

## 2024-08-15 DIAGNOSIS — I10 HYPERTENSION, UNSPECIFIED TYPE: ICD-10-CM

## 2024-08-15 DIAGNOSIS — Z76.0 ENCOUNTER FOR MEDICATION REFILL: Primary | ICD-10-CM

## 2024-08-15 DIAGNOSIS — E78.2 MIXED HYPERLIPIDEMIA: ICD-10-CM

## 2024-08-15 DIAGNOSIS — I25.810 CORONARY ARTERY DISEASE INVOLVING CORONARY BYPASS GRAFT OF NATIVE HEART WITHOUT ANGINA PECTORIS: ICD-10-CM

## 2024-08-15 PROCEDURE — 99999 PR PBB SHADOW E&M-EST. PATIENT-LVL III: CPT | Mod: PBBFAC,,, | Performed by: INTERNAL MEDICINE

## 2024-08-15 PROCEDURE — 99213 OFFICE O/P EST LOW 20 MIN: CPT | Mod: PBBFAC,PN | Performed by: INTERNAL MEDICINE

## 2024-08-15 PROCEDURE — 99213 OFFICE O/P EST LOW 20 MIN: CPT | Mod: S$PBB,,, | Performed by: INTERNAL MEDICINE

## 2024-08-15 RX ORDER — ATORVASTATIN CALCIUM 40 MG/1
40 TABLET, FILM COATED ORAL DAILY
Qty: 90 TABLET | Refills: 3 | Status: SHIPPED | OUTPATIENT
Start: 2024-08-15

## 2024-08-15 RX ORDER — METOPROLOL SUCCINATE 25 MG/1
25 TABLET, EXTENDED RELEASE ORAL DAILY
Qty: 90 TABLET | Refills: 3 | Status: SHIPPED | OUTPATIENT
Start: 2024-08-15

## 2024-08-15 RX ORDER — LISINOPRIL 20 MG/1
20 TABLET ORAL DAILY
Qty: 90 TABLET | Refills: 3 | Status: SHIPPED | OUTPATIENT
Start: 2024-08-15 | End: 2025-08-15

## 2024-08-15 RX ORDER — OMEPRAZOLE 20 MG/1
20 CAPSULE, DELAYED RELEASE ORAL DAILY
Qty: 90 CAPSULE | Refills: 2 | Status: SHIPPED | OUTPATIENT
Start: 2024-08-15

## 2024-08-15 NOTE — ASSESSMENT & PLAN NOTE
He has been off his medications.  Refills were given.  Lisinopril 20 mg daily will be started in addition to the Toprol-XL 25 mg p.o. daily.

## 2024-08-15 NOTE — PROGRESS NOTES
Patient ID:  Nate Torre is a 49 y.o. male who presents for follow-up of Hypertension      Coronary artery disease involving coronary bypass graft of native heart without angina pectoris  Patient had CABG x 6 2016.  Patient denies chest pain or anginal equivalent symptoms.  He is active.   Blood pressure today in office is 160/94.       Hyperlipidemia  Recent .  Patient has not been taking atorvastatin 40 mg daily.  Patient to start taking daily.  Continue low-sodium heart healthy diet.     Hypertension  Blood pressure today in office 160/94.  Patient has been on medications for 3-4 days.  Continue current medication regimen.  Notify me if blood pressure remains greater than 130/80 consistently.  Recommend low-sodium are healthy diet.  Reduce stressors.  Reduce caffeine intake.    He run out of blood pressure medications about a month ago.  The only 1 he has is clonidine 0.1 mg and he is taking it every 3rd day.  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  He uses the stairs and at times he noticed some tachycardia and dyspnea on exertion.  He denies any chest pains.  He has history of peptic ulcer disease on omeprazole.  He had coronary artery bypass performed in 2016 at that time he was having symptoms of chest pains.  He denies any chest pains at present.  His blood pressure 168/98.  The medications on the chart revealed that he was on amlodipine benazepril although he has not been taking that medication.  The only thing he is taking for hypertension is metoprolol that he has not taken it for over a month.  He does describe some erectile dysfunction.  Will prescribe something after his blood pressure is well controlled.             Past Medical History:   Diagnosis Date    Coronary artery disease     Hypertension         Past Surgical History:   Procedure Laterality Date    CORONARY ARTERY BYPASS GRAFT            Current Outpatient Medications   Medication Instructions    aspirin (ECOTRIN) 81 mg, Oral, Once     "atorvastatin (LIPITOR) 40 mg, Oral, Daily    cloNIDine (CATAPRES) 0.1 MG tablet TAKE 1 TABLET BY MOUTH ONCE DAILY AS NEEDED FOR SYSTOLIC BLOOD PRESSURE OVER 170    diclofenac (VOLTAREN) 75 mg, Oral, 2 times daily    hydrocortisone 2.5 % cream Topical (Top), 2 times daily    ibuprofen (ADVIL,MOTRIN) 600 mg, Oral, Every 6 hours PRN    lisinopriL (PRINIVIL,ZESTRIL) 20 mg, Oral, Daily    metoprolol succinate (TOPROL-XL) 25 mg, Oral    metoprolol succinate (TOPROL-XL) 25 mg, Oral, Daily    omeprazole (PRILOSEC) 20 mg, Oral    omeprazole (PRILOSEC) 20 mg, Oral, Daily        Review of patient's allergies indicates:  No Known Allergies     Review of Systems   Cardiovascular:  Positive for dyspnea on exertion. Negative for chest pain and palpitations.   Respiratory:  Negative for cough and shortness of breath.         Objective:     Vitals:    08/15/24 1118   BP: 130/80   BP Location: Left arm   Patient Position: Sitting   BP Method: Medium (Manual)   Pulse: 78   SpO2: 98%   Weight: 90.2 kg (198 lb 13.7 oz)   Height: 5' 8" (1.727 m)       Physical Exam  Vitals and nursing note reviewed.   Constitutional:       Appearance: He is well-developed.   HENT:      Head: Normocephalic and atraumatic.   Eyes:      Conjunctiva/sclera: Conjunctivae normal.   Cardiovascular:      Rate and Rhythm: Normal rate and regular rhythm.      Heart sounds: Normal heart sounds.   Pulmonary:      Effort: Pulmonary effort is normal.      Breath sounds: Normal breath sounds.   Abdominal:      General: Bowel sounds are normal.      Palpations: Abdomen is soft.   Musculoskeletal:         General: Normal range of motion.   Skin:     General: Skin is warm and dry.   Neurological:      Mental Status: He is alert and oriented to person, place, and time.   Psychiatric:         Behavior: Behavior normal.         Thought Content: Thought content normal.         Judgment: Judgment normal.       CMP  No results found for: "NA", "K", "CL", "CO2", "GLU", "BUN", " ""CREATININE", "CALCIUM", "PROT", "ALBUMIN", "BILITOT", "ALKPHOS", "AST", "ALT", "ANIONGAP", "ESTGFRAFRICA", "EGFRNONAA"   BMP  No results found for: "NA", "K", "CL", "CO2", "BUN", "CREATININE", "CALCIUM", "ANIONGAP", "ESTGFRAFRICA", "EGFRNONAA"   BNP  @LABRCNTIP(BNP,BNPTRIAGEBLO)@   Lab Results   Component Value Date    CHOL 233 (H) 03/30/2023     Lab Results   Component Value Date    HDL 54 03/30/2023     Lab Results   Component Value Date    LDLCALC 162.2 (H) 03/30/2023     Lab Results   Component Value Date    TRIG 84 03/30/2023     Lab Results   Component Value Date    CHOLHDL 23.2 03/30/2023      No results found for: "TSH", "U8MACPS", "X8MAGQF", "THYROIDAB", "FREET4"  No results found for: "LABA1C", "HGBA1C"  No results found for: "WBC", "HGB", "HCT", "MCV", "PLT"      No results found for this or any previous visit.     No results found for this or any previous visit.     EKG  Results for orders placed or performed in visit on 03/30/23   IN OFFICE EKG 12-LEAD (to [place])    Collection Time: 03/30/23  9:19 AM    Narrative    Test Reason : I10,I25.810,    Vent. Rate : 074 BPM     Atrial Rate : 074 BPM     P-R Int : 162 ms          QRS Dur : 094 ms      QT Int : 382 ms       P-R-T Axes : 074 094 037 degrees     QTc Int : 424 ms    Normal sinus rhythm  Right atrial enlargement  Rightward axis  Borderline Abnormal ECG  No previous ECGs available  Confirmed by Slick CENTENO, Hugo BILL ([id number]) on 4/16/2023 8:10:53 PM    Referred By:             Confirmed By:Hugo Kruger MD      Stress  Results for orders placed during the hospital encounter of 10/11/21    Exercise Stress - EKG    Interpretation Summary    The EKG portion of this study is abnormal but not diagnostic.    The patient reported no chest pain during the stress test.    The blood pressure response to stress was normal.    There were no arrhythmias during stress.    Recommend a myocardial perfusion imaging study to enhance the specificity and sensitivity of the " test             Assessment:       Coronary artery disease involving coronary bypass graft of native heart without angina pectoris  Coronary artery disease with previous coronary artery bypass.    Hyperlipidemia  His LDL cholesterol is markedly elevated.  He has been compliant with his statin    Hypertension  He has been off his medications.  Refills were given.  Lisinopril 20 mg daily will be started in addition to the Toprol-XL 25 mg p.o. daily.       Plan:       He is to resume the metoprolol 25 mg daily, atorvastatin 40 mg daily, aspirin 81 mg daily, lisinopril 20 mg daily.  He is to have a CMP , lipid profile and a TSH from [facility].  He is to bring the results with him in 3 months.  At that time will consider using medications for his erectile dysfunction.

## 2025-04-22 DIAGNOSIS — Z76.0 ENCOUNTER FOR MEDICATION REFILL: ICD-10-CM

## 2025-04-22 DIAGNOSIS — I10 HYPERTENSION, UNSPECIFIED TYPE: ICD-10-CM

## 2025-04-22 DIAGNOSIS — I25.810 CORONARY ARTERY DISEASE INVOLVING CORONARY BYPASS GRAFT OF NATIVE HEART WITHOUT ANGINA PECTORIS: ICD-10-CM

## 2025-04-23 RX ORDER — OMEPRAZOLE 20 MG/1
20 CAPSULE, DELAYED RELEASE ORAL
Qty: 90 CAPSULE | Refills: 0 | Status: SHIPPED | OUTPATIENT
Start: 2025-04-23

## 2025-04-24 DIAGNOSIS — I27.20 HYPERTENSIVE PULMONARY VASCULAR DISEASE: ICD-10-CM

## 2025-04-25 RX ORDER — CLONIDINE HYDROCHLORIDE 0.1 MG/1
TABLET ORAL
Qty: 30 TABLET | Refills: 3 | Status: SHIPPED | OUTPATIENT
Start: 2025-04-25

## 2025-07-27 DIAGNOSIS — I25.810 CORONARY ARTERY DISEASE INVOLVING CORONARY BYPASS GRAFT OF NATIVE HEART WITHOUT ANGINA PECTORIS: ICD-10-CM

## 2025-07-27 DIAGNOSIS — I10 HYPERTENSION, UNSPECIFIED TYPE: ICD-10-CM

## 2025-07-27 DIAGNOSIS — Z76.0 ENCOUNTER FOR MEDICATION REFILL: ICD-10-CM

## 2025-07-29 RX ORDER — METOPROLOL SUCCINATE 25 MG/1
25 TABLET, EXTENDED RELEASE ORAL
Qty: 90 TABLET | Refills: 0 | Status: SHIPPED | OUTPATIENT
Start: 2025-07-29

## 2025-07-29 RX ORDER — OMEPRAZOLE 20 MG/1
20 CAPSULE, DELAYED RELEASE ORAL
Qty: 90 CAPSULE | Refills: 0 | Status: SHIPPED | OUTPATIENT
Start: 2025-07-29

## 2025-07-29 RX ORDER — LISINOPRIL 20 MG/1
20 TABLET ORAL
Qty: 90 TABLET | Refills: 0 | Status: SHIPPED | OUTPATIENT
Start: 2025-07-29